# Patient Record
Sex: FEMALE | Race: BLACK OR AFRICAN AMERICAN | NOT HISPANIC OR LATINO
[De-identification: names, ages, dates, MRNs, and addresses within clinical notes are randomized per-mention and may not be internally consistent; named-entity substitution may affect disease eponyms.]

---

## 2017-02-01 ENCOUNTER — APPOINTMENT (OUTPATIENT)
Dept: INTERNAL MEDICINE | Facility: CLINIC | Age: 42
End: 2017-02-01

## 2017-02-01 VITALS — SYSTOLIC BLOOD PRESSURE: 122 MMHG | DIASTOLIC BLOOD PRESSURE: 80 MMHG

## 2017-02-01 VITALS
HEIGHT: 62 IN | DIASTOLIC BLOOD PRESSURE: 80 MMHG | WEIGHT: 169 LBS | HEART RATE: 89 BPM | BODY MASS INDEX: 31.1 KG/M2 | SYSTOLIC BLOOD PRESSURE: 120 MMHG | OXYGEN SATURATION: 99 %

## 2017-02-01 VITALS — DIASTOLIC BLOOD PRESSURE: 80 MMHG | SYSTOLIC BLOOD PRESSURE: 132 MMHG

## 2017-02-01 VITALS — SYSTOLIC BLOOD PRESSURE: 142 MMHG | DIASTOLIC BLOOD PRESSURE: 80 MMHG

## 2017-02-02 LAB
25(OH)D3 SERPL-MCNC: 11.8 NG/ML
ALBUMIN SERPL ELPH-MCNC: 3.8 G/DL
ALP BLD-CCNC: 40 U/L
ALT SERPL-CCNC: 10 U/L
ANION GAP SERPL CALC-SCNC: 13 MMOL/L
AST SERPL-CCNC: 15 U/L
BASOPHILS # BLD AUTO: 0.02 K/UL
BASOPHILS NFR BLD AUTO: 0.5 %
BILIRUB SERPL-MCNC: 0.7 MG/DL
BUN SERPL-MCNC: 13 MG/DL
CALCIUM SERPL-MCNC: 9.4 MG/DL
CHLORIDE SERPL-SCNC: 103 MMOL/L
CHOLEST SERPL-MCNC: 268 MG/DL
CHOLEST/HDLC SERPL: 2.9 RATIO
CO2 SERPL-SCNC: 23 MMOL/L
CREAT SERPL-MCNC: 0.78 MG/DL
CREAT SPEC-SCNC: 208 MG/DL
CREAT/PROT UR: 0 RATIO
EOSINOPHIL # BLD AUTO: 0.11 K/UL
EOSINOPHIL NFR BLD AUTO: 2.9 %
FERRITIN SERPL-MCNC: 8.9 NG/ML
GLUCOSE SERPL-MCNC: 82 MG/DL
HBA1C MFR BLD HPLC: 5.9 %
HCT VFR BLD CALC: 33.5 %
HDLC SERPL-MCNC: 93 MG/DL
HGB BLD-MCNC: 10.1 G/DL
IMM GRANULOCYTES NFR BLD AUTO: 0 %
IRON SATN MFR SERPL: 7 %
IRON SERPL-MCNC: 30 UG/DL
LDLC SERPL CALC-MCNC: 167 MG/DL
LYMPHOCYTES # BLD AUTO: 2.42 K/UL
LYMPHOCYTES NFR BLD AUTO: 63.2 %
MAN DIFF?: NORMAL
MCHC RBC-ENTMCNC: 22.9 PG
MCHC RBC-ENTMCNC: 30.1 GM/DL
MCV RBC AUTO: 75.8 FL
MONOCYTES # BLD AUTO: 0.26 K/UL
MONOCYTES NFR BLD AUTO: 6.8 %
NEUTROPHILS # BLD AUTO: 1.02 K/UL
NEUTROPHILS NFR BLD AUTO: 26.6 %
PLATELET # BLD AUTO: 366 K/UL
POTASSIUM SERPL-SCNC: 4.2 MMOL/L
PROT SERPL-MCNC: 7.4 G/DL
PROT UR-MCNC: 7 MG/DL
RBC # BLD: 4.42 M/UL
RBC # FLD: 18.5 %
SODIUM SERPL-SCNC: 139 MMOL/L
TIBC SERPL-MCNC: 430 UG/DL
TRIGL SERPL-MCNC: 39 MG/DL
UIBC SERPL-MCNC: 400 UG/DL
WBC # FLD AUTO: 3.83 K/UL

## 2017-02-06 LAB
ADJUSTED MITOGEN: >10 IU/ML
ADJUSTED TB AG: 0 IU/ML
M TB IFN-G BLD-IMP: NEGATIVE
QUANTIFERON GOLD NIL: 0.06 IU/ML

## 2017-02-17 ENCOUNTER — RX RENEWAL (OUTPATIENT)
Age: 42
End: 2017-02-17

## 2017-03-03 ENCOUNTER — APPOINTMENT (OUTPATIENT)
Dept: INTERNAL MEDICINE | Facility: CLINIC | Age: 42
End: 2017-03-03

## 2017-03-03 VITALS
DIASTOLIC BLOOD PRESSURE: 80 MMHG | HEART RATE: 76 BPM | OXYGEN SATURATION: 97 % | HEIGHT: 62 IN | SYSTOLIC BLOOD PRESSURE: 114 MMHG | BODY MASS INDEX: 31.83 KG/M2 | WEIGHT: 173 LBS

## 2017-03-03 VITALS — SYSTOLIC BLOOD PRESSURE: 116 MMHG | DIASTOLIC BLOOD PRESSURE: 74 MMHG

## 2017-03-03 RX ORDER — IRON/C/B12/CALCIU/STOMACH CONC 70-150-10
70 TABLET ORAL DAILY
Qty: 90 | Refills: 3 | Status: DISCONTINUED | COMMUNITY
Start: 2017-02-02 | End: 2017-03-03

## 2017-06-02 ENCOUNTER — APPOINTMENT (OUTPATIENT)
Dept: OPHTHALMOLOGY | Facility: CLINIC | Age: 42
End: 2017-06-02

## 2017-06-02 DIAGNOSIS — Z83.3 FAMILY HISTORY OF DIABETES MELLITUS: ICD-10-CM

## 2017-06-02 DIAGNOSIS — H20.9 UNSPECIFIED IRIDOCYCLITIS: ICD-10-CM

## 2017-06-02 DIAGNOSIS — Z86.79 PERSONAL HISTORY OF OTHER DISEASES OF THE CIRCULATORY SYSTEM: ICD-10-CM

## 2017-06-02 DIAGNOSIS — Z82.49 FAMILY HISTORY OF ISCHEMIC HEART DISEASE AND OTHER DISEASES OF THE CIRCULATORY SYSTEM: ICD-10-CM

## 2017-06-02 DIAGNOSIS — Z78.9 OTHER SPECIFIED HEALTH STATUS: ICD-10-CM

## 2017-06-02 RX ORDER — DORZOLAMIDE HYDROCHLORIDE TIMOLOL MALEATE 20; 5 MG/ML; MG/ML
22.3-6.8 SOLUTION/ DROPS OPHTHALMIC TWICE DAILY
Qty: 1 | Refills: 0 | Status: ACTIVE | COMMUNITY
Start: 2017-06-02 | End: 1900-01-01

## 2017-07-07 ENCOUNTER — APPOINTMENT (OUTPATIENT)
Dept: OPHTHALMOLOGY | Facility: CLINIC | Age: 42
End: 2017-07-07

## 2017-07-13 ENCOUNTER — LABORATORY RESULT (OUTPATIENT)
Age: 42
End: 2017-07-13

## 2017-07-13 ENCOUNTER — APPOINTMENT (OUTPATIENT)
Dept: INTERNAL MEDICINE | Facility: CLINIC | Age: 42
End: 2017-07-13

## 2017-07-13 RX ORDER — IBUPROFEN 600 MG/1
600 TABLET, FILM COATED ORAL
Qty: 30 | Refills: 0 | Status: COMPLETED | COMMUNITY
Start: 2017-02-17

## 2017-07-13 RX ORDER — ERGOCALCIFEROL 1.25 MG/1
1.25 MG CAPSULE, LIQUID FILLED ORAL
Qty: 12 | Refills: 1 | Status: COMPLETED | COMMUNITY
Start: 2017-02-02 | End: 2018-01-09

## 2017-07-13 RX ORDER — OXYCODONE AND ACETAMINOPHEN 5; 325 MG/1; MG/1
5-325 TABLET ORAL
Qty: 15 | Refills: 0 | Status: COMPLETED | COMMUNITY
Start: 2017-02-15

## 2017-07-13 RX ORDER — DOXYCYCLINE 100 MG/1
100 CAPSULE ORAL
Qty: 14 | Refills: 0 | Status: COMPLETED | COMMUNITY
Start: 2017-02-17

## 2017-07-14 ENCOUNTER — RX RENEWAL (OUTPATIENT)
Age: 42
End: 2017-07-14

## 2017-07-14 LAB
25(OH)D3 SERPL-MCNC: 20.8 NG/ML
BASOPHILS # BLD AUTO: 0 K/UL
BASOPHILS NFR BLD AUTO: 0 %
EOSINOPHIL # BLD AUTO: 0.06 K/UL
EOSINOPHIL NFR BLD AUTO: 1.8 %
FERRITIN SERPL-MCNC: 10 NG/ML
HCT VFR BLD CALC: 32.7 %
HGB BLD-MCNC: 10.6 G/DL
IRON SATN MFR SERPL: 10 %
IRON SERPL-MCNC: 40 UG/DL
LYMPHOCYTES # BLD AUTO: 1.74 K/UL
LYMPHOCYTES NFR BLD AUTO: 54.3 %
MAN DIFF?: NORMAL
MCHC RBC-ENTMCNC: 25.4 PG
MCHC RBC-ENTMCNC: 32.4 GM/DL
MCV RBC AUTO: 78.4 FL
MONOCYTES # BLD AUTO: 0.2 K/UL
MONOCYTES NFR BLD AUTO: 6.1 %
NEUTROPHILS # BLD AUTO: 1.16 K/UL
NEUTROPHILS NFR BLD AUTO: 36 %
PLATELET # BLD AUTO: 321 K/UL
RBC # BLD: 4.17 M/UL
RBC # FLD: 15.4 %
TIBC SERPL-MCNC: 393 UG/DL
UIBC SERPL-MCNC: 353 UG/DL
WBC # FLD AUTO: 3.21 K/UL

## 2017-08-02 ENCOUNTER — RX RENEWAL (OUTPATIENT)
Age: 42
End: 2017-08-02

## 2017-08-07 ENCOUNTER — APPOINTMENT (OUTPATIENT)
Dept: INTERNAL MEDICINE | Facility: CLINIC | Age: 42
End: 2017-08-07

## 2017-10-12 ENCOUNTER — RX RENEWAL (OUTPATIENT)
Age: 42
End: 2017-10-12

## 2017-10-30 ENCOUNTER — APPOINTMENT (OUTPATIENT)
Dept: OPHTHALMOLOGY | Facility: CLINIC | Age: 42
End: 2017-10-30
Payer: COMMERCIAL

## 2017-10-30 PROCEDURE — 92012 INTRM OPH EXAM EST PATIENT: CPT

## 2017-10-30 PROCEDURE — 92133 CPTRZD OPH DX IMG PST SGM ON: CPT

## 2017-10-30 RX ORDER — BRIMONIDINE TARTRATE 1 MG/ML
0.1 SOLUTION/ DROPS OPHTHALMIC TWICE DAILY
Qty: 1 | Refills: 3 | Status: ACTIVE | COMMUNITY
Start: 2017-06-02 | End: 1900-01-01

## 2017-10-30 RX ORDER — DIFLUPREDNATE 0.5 MG/ML
0.05 EMULSION OPHTHALMIC
Qty: 1 | Refills: 3 | Status: ACTIVE | COMMUNITY
Start: 2017-10-30 | End: 1900-01-01

## 2018-02-02 ENCOUNTER — APPOINTMENT (OUTPATIENT)
Dept: OPHTHALMOLOGY | Facility: CLINIC | Age: 43
End: 2018-02-02

## 2018-02-20 ENCOUNTER — RX RENEWAL (OUTPATIENT)
Age: 43
End: 2018-02-20

## 2018-07-27 ENCOUNTER — APPOINTMENT (OUTPATIENT)
Dept: INTERNAL MEDICINE | Facility: CLINIC | Age: 43
End: 2018-07-27
Payer: COMMERCIAL

## 2018-07-27 VITALS — DIASTOLIC BLOOD PRESSURE: 76 MMHG | SYSTOLIC BLOOD PRESSURE: 124 MMHG

## 2018-07-27 VITALS
HEART RATE: 72 BPM | DIASTOLIC BLOOD PRESSURE: 90 MMHG | OXYGEN SATURATION: 97 % | HEIGHT: 62 IN | SYSTOLIC BLOOD PRESSURE: 138 MMHG | BODY MASS INDEX: 32.76 KG/M2 | WEIGHT: 178 LBS

## 2018-07-27 PROCEDURE — 99213 OFFICE O/P EST LOW 20 MIN: CPT

## 2018-07-27 RX ORDER — FERROUS SULFATE 137(45) MG
142 (45 FE) TABLET, EXTENDED RELEASE ORAL
Qty: 90 | Refills: 3 | Status: COMPLETED | COMMUNITY
Start: 2017-03-03 | End: 2017-07-27

## 2018-07-27 RX ORDER — METAXALONE 800 MG/1
800 TABLET ORAL
Qty: 6 | Refills: 0 | Status: COMPLETED | COMMUNITY
Start: 2018-07-27 | End: 2018-08-02

## 2018-07-27 RX ORDER — METRONIDAZOLE 500 MG/1
500 TABLET ORAL
Qty: 14 | Refills: 0 | Status: COMPLETED | COMMUNITY
Start: 2018-04-19

## 2018-07-27 RX ORDER — IPRATROPIUM BROMIDE 42 UG/1
0.06 SPRAY NASAL 3 TIMES DAILY
Qty: 1 | Refills: 5 | Status: COMPLETED | COMMUNITY
Start: 2017-07-13 | End: 2018-07-27

## 2018-07-27 RX ORDER — VALSARTAN 80 MG/1
80 TABLET, COATED ORAL
Qty: 180 | Refills: 3 | Status: DISCONTINUED | COMMUNITY
Start: 2017-02-01 | End: 2018-07-27

## 2018-07-27 RX ORDER — DORZOLAMIDE HYDROCHLORIDE TIMOLOL MALEATE 20; 5 MG/ML; MG/ML
22.3-6.8 SOLUTION/ DROPS OPHTHALMIC TWICE DAILY
Qty: 1 | Refills: 2 | Status: COMPLETED | COMMUNITY
Start: 2017-08-02 | End: 2017-09-27

## 2018-07-27 NOTE — REVIEW OF SYSTEMS
[Fever] : no fever [Chills] : no chills [Fatigue] : no fatigue [Chest Pain] : no chest pain [Palpitations] : no palpitations [Leg Claudication] : no leg claudication [Shortness Of Breath] : no shortness of breath
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
## 2018-07-29 ENCOUNTER — FORM ENCOUNTER (OUTPATIENT)
Age: 43
End: 2018-07-29

## 2018-07-30 ENCOUNTER — OUTPATIENT (OUTPATIENT)
Dept: OUTPATIENT SERVICES | Facility: HOSPITAL | Age: 43
LOS: 1 days | End: 2018-07-30
Payer: COMMERCIAL

## 2018-07-30 ENCOUNTER — APPOINTMENT (OUTPATIENT)
Dept: RADIOLOGY | Facility: CLINIC | Age: 43
End: 2018-07-30
Payer: COMMERCIAL

## 2018-07-30 DIAGNOSIS — M62.838 OTHER MUSCLE SPASM: ICD-10-CM

## 2018-07-30 PROCEDURE — 72020 X-RAY EXAM OF SPINE 1 VIEW: CPT

## 2018-07-30 PROCEDURE — 72020 X-RAY EXAM OF SPINE 1 VIEW: CPT | Mod: 26

## 2018-08-06 ENCOUNTER — APPOINTMENT (OUTPATIENT)
Dept: OPHTHALMOLOGY | Facility: CLINIC | Age: 43
End: 2018-08-06
Payer: COMMERCIAL

## 2018-08-06 ENCOUNTER — TRANSCRIPTION ENCOUNTER (OUTPATIENT)
Age: 43
End: 2018-08-06

## 2018-08-06 PROCEDURE — 92014 COMPRE OPH EXAM EST PT 1/>: CPT

## 2018-08-06 PROCEDURE — 92133 CPTRZD OPH DX IMG PST SGM ON: CPT

## 2018-08-06 RX ORDER — TIMOLOL MALEATE 5 MG/ML
0.5 SOLUTION OPHTHALMIC
Qty: 3 | Refills: 3 | Status: ACTIVE | COMMUNITY
Start: 2017-10-30 | End: 1900-01-01

## 2018-08-23 ENCOUNTER — APPOINTMENT (OUTPATIENT)
Dept: INTERNAL MEDICINE | Facility: CLINIC | Age: 43
End: 2018-08-23
Payer: COMMERCIAL

## 2018-08-23 VITALS
WEIGHT: 177 LBS | DIASTOLIC BLOOD PRESSURE: 78 MMHG | OXYGEN SATURATION: 99 % | HEIGHT: 62 IN | BODY MASS INDEX: 32.57 KG/M2 | HEART RATE: 78 BPM | TEMPERATURE: 98.7 F | SYSTOLIC BLOOD PRESSURE: 134 MMHG

## 2018-08-23 PROCEDURE — 99214 OFFICE O/P EST MOD 30 MIN: CPT

## 2018-08-23 RX ORDER — DROSPIRENONE, ETHINYL ESTRADIOL AND LEVOMEFOLATE CALCIUM AND LEVOMEFOLATE CALCIUM 3-0.02(24)
3-0.02-0.451 KIT ORAL
Qty: 28 | Refills: 0 | Status: COMPLETED | COMMUNITY
Start: 2018-01-09 | End: 2018-02-23

## 2018-08-23 NOTE — HISTORY OF PRESENT ILLNESS
[FreeTextEntry1] : HTN [de-identified] : 42 yo 80% better neck pain  No trauma\par \par HTN  does take BP at home low at home, high at work 158/91  at home 143/90\par Very dizzy on felodipine 2016\par \par Had lower BP and HA w felodipine\par Unclear to me why not taking HCTZ  She was under the impression to stop it since she did not void when taking it

## 2018-08-23 NOTE — REVIEW OF SYSTEMS
[Fever] : no fever [Discharge] : no discharge [Chest Pain] : no chest pain [Palpitations] : no palpitations [Dysuria] : no dysuria

## 2018-08-23 NOTE — COUNSELING
[None] : None [de-identified] : Not exercising yet 16-18 hours a day 4 days a week \par Vacation [place]\par Insurance didn't pay for telmisartan\par \par Uveitis W/U negative  lens implant

## 2018-08-24 LAB
25(OH)D3 SERPL-MCNC: 21.4 NG/ML
ALBUMIN SERPL ELPH-MCNC: 4.1 G/DL
ALP BLD-CCNC: 36 U/L
ALT SERPL-CCNC: 9 U/L
ANION GAP SERPL CALC-SCNC: 19 MMOL/L
AST SERPL-CCNC: 14 U/L
BILIRUB SERPL-MCNC: 0.4 MG/DL
BUN SERPL-MCNC: 14 MG/DL
CALCIUM SERPL-MCNC: 9.4 MG/DL
CHLORIDE SERPL-SCNC: 106 MMOL/L
CHOLEST SERPL-MCNC: 224 MG/DL
CHOLEST/HDLC SERPL: 2.7 RATIO
CO2 SERPL-SCNC: 18 MMOL/L
CREAT SERPL-MCNC: 1.05 MG/DL
CREAT SPEC-SCNC: 203 MG/DL
CREAT/PROT UR: 0 RATIO
GLUCOSE SERPL-MCNC: 77 MG/DL
HDLC SERPL-MCNC: 82 MG/DL
HIV1+2 AB SPEC QL IA.RAPID: NONREACTIVE
LDLC SERPL CALC-MCNC: 124 MG/DL
POTASSIUM SERPL-SCNC: 4.9 MMOL/L
PROT SERPL-MCNC: 7.3 G/DL
PROT UR-MCNC: 9 MG/DL
SODIUM SERPL-SCNC: 143 MMOL/L
T PALLIDUM AB SER QL IA: NEGATIVE
TRIGL SERPL-MCNC: 92 MG/DL

## 2018-10-05 ENCOUNTER — APPOINTMENT (OUTPATIENT)
Dept: INTERNAL MEDICINE | Facility: CLINIC | Age: 43
End: 2018-10-05
Payer: COMMERCIAL

## 2018-10-05 VITALS — DIASTOLIC BLOOD PRESSURE: 60 MMHG | SYSTOLIC BLOOD PRESSURE: 104 MMHG

## 2018-10-05 VITALS — DIASTOLIC BLOOD PRESSURE: 70 MMHG | SYSTOLIC BLOOD PRESSURE: 114 MMHG

## 2018-10-05 VITALS
OXYGEN SATURATION: 99 % | BODY MASS INDEX: 33.49 KG/M2 | DIASTOLIC BLOOD PRESSURE: 78 MMHG | HEIGHT: 62 IN | HEART RATE: 104 BPM | WEIGHT: 182 LBS | SYSTOLIC BLOOD PRESSURE: 132 MMHG

## 2018-10-05 PROCEDURE — 99396 PREV VISIT EST AGE 40-64: CPT

## 2018-10-05 PROCEDURE — 99386 PREV VISIT NEW AGE 40-64: CPT
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
## 2018-10-05 NOTE — ASSESSMENT
[FreeTextEntry1] : Reacting to higher ARB dose  so reduce to once daily in AM and RTO check BP on HCTZ 12.5 + losartan 50mg ONCE instead of twice daily\par \par Declines flu vaccine\par Get med release mammogram

## 2018-10-05 NOTE — HEALTH RISK ASSESSMENT
[No falls in past year] : Patient reported no falls in the past year [Discussed at today's visit] : Advance Directives Discussed at today's visit [Patient reported PAP Smear was normal] : Patient reported PAP Smear was normal [] : No [MammogramDate] : 01/18 [MammogramComments] : BIRADS 1 [PapSmearDate] : 06/18

## 2018-10-10 ENCOUNTER — APPOINTMENT (OUTPATIENT)
Dept: OPHTHALMOLOGY | Facility: CLINIC | Age: 43
End: 2018-10-10

## 2018-11-16 ENCOUNTER — APPOINTMENT (OUTPATIENT)
Dept: INTERNAL MEDICINE | Facility: CLINIC | Age: 43
End: 2018-11-16

## 2019-02-20 ENCOUNTER — APPOINTMENT (OUTPATIENT)
Dept: OPHTHALMOLOGY | Facility: CLINIC | Age: 44
End: 2019-02-20
Payer: COMMERCIAL

## 2019-02-20 ENCOUNTER — APPOINTMENT (OUTPATIENT)
Dept: OPHTHALMOLOGY | Facility: CLINIC | Age: 44
End: 2019-02-20
Payer: SELF-PAY

## 2019-02-20 DIAGNOSIS — H40.051 OCULAR HYPERTENSION, RIGHT EYE: ICD-10-CM

## 2019-02-20 PROCEDURE — 92310B: CUSTOM

## 2019-02-20 PROCEDURE — 92133 CPTRZD OPH DX IMG PST SGM ON: CPT

## 2019-02-20 PROCEDURE — 92012 INTRM OPH EXAM EST PATIENT: CPT

## 2019-02-20 PROCEDURE — 92015 DETERMINE REFRACTIVE STATE: CPT

## 2019-03-07 ENCOUNTER — APPOINTMENT (OUTPATIENT)
Dept: OPHTHALMOLOGY | Facility: CLINIC | Age: 44
End: 2019-03-07
Payer: SELF-PAY

## 2019-03-07 PROCEDURE — 92331: CPT | Mod: NC

## 2019-03-19 ENCOUNTER — APPOINTMENT (OUTPATIENT)
Dept: OPHTHALMOLOGY | Facility: CLINIC | Age: 44
End: 2019-03-19
Payer: COMMERCIAL

## 2019-03-19 PROCEDURE — 00009N: CUSTOM | Mod: NC

## 2019-04-02 ENCOUNTER — APPOINTMENT (OUTPATIENT)
Dept: OPHTHALMOLOGY | Facility: CLINIC | Age: 44
End: 2019-04-02
Payer: COMMERCIAL

## 2019-04-02 PROCEDURE — 92331: CPT | Mod: NC

## 2019-11-01 ENCOUNTER — TRANSCRIPTION ENCOUNTER (OUTPATIENT)
Age: 44
End: 2019-11-01

## 2019-11-02 ENCOUNTER — EMERGENCY (EMERGENCY)
Facility: HOSPITAL | Age: 44
LOS: 1 days | Discharge: HOME | End: 2019-11-02
Attending: EMERGENCY MEDICINE
Payer: COMMERCIAL

## 2019-11-02 VITALS — SYSTOLIC BLOOD PRESSURE: 157 MMHG | HEART RATE: 74 BPM | DIASTOLIC BLOOD PRESSURE: 95 MMHG | TEMPERATURE: 98 F

## 2019-11-02 VITALS — DIASTOLIC BLOOD PRESSURE: 122 MMHG | SYSTOLIC BLOOD PRESSURE: 217 MMHG

## 2019-11-02 LAB
ALBUMIN SERPL ELPH-MCNC: 4.2 G/DL — SIGNIFICANT CHANGE UP (ref 3.3–5)
ALP SERPL-CCNC: 53 U/L — SIGNIFICANT CHANGE UP (ref 40–120)
ALT FLD-CCNC: 10 U/L — SIGNIFICANT CHANGE UP (ref 10–45)
ANION GAP SERPL CALC-SCNC: 9 MMOL/L — SIGNIFICANT CHANGE UP (ref 5–17)
AST SERPL-CCNC: 9 U/L — LOW (ref 10–40)
BASOPHILS # BLD AUTO: 0.03 K/UL — SIGNIFICANT CHANGE UP (ref 0–0.2)
BASOPHILS NFR BLD AUTO: 0.6 % — SIGNIFICANT CHANGE UP (ref 0–2)
BILIRUB SERPL-MCNC: 0.6 MG/DL — SIGNIFICANT CHANGE UP (ref 0.2–1.2)
BUN SERPL-MCNC: 11 MG/DL — SIGNIFICANT CHANGE UP (ref 7–23)
CALCIUM SERPL-MCNC: 9.1 MG/DL — SIGNIFICANT CHANGE UP (ref 8.4–10.5)
CHLORIDE SERPL-SCNC: 105 MMOL/L — SIGNIFICANT CHANGE UP (ref 96–108)
CO2 SERPL-SCNC: 24 MMOL/L — SIGNIFICANT CHANGE UP (ref 22–31)
CREAT SERPL-MCNC: 0.95 MG/DL — SIGNIFICANT CHANGE UP (ref 0.5–1.3)
EOSINOPHIL # BLD AUTO: 0.09 K/UL — SIGNIFICANT CHANGE UP (ref 0–0.5)
EOSINOPHIL NFR BLD AUTO: 1.7 % — SIGNIFICANT CHANGE UP (ref 0–6)
GLUCOSE SERPL-MCNC: 97 MG/DL — SIGNIFICANT CHANGE UP (ref 70–99)
HCT VFR BLD CALC: 35 % — SIGNIFICANT CHANGE UP (ref 34.5–45)
HGB BLD-MCNC: 11.5 G/DL — SIGNIFICANT CHANGE UP (ref 11.5–15.5)
IMM GRANULOCYTES NFR BLD AUTO: 0.2 % — SIGNIFICANT CHANGE UP (ref 0–1.5)
LYMPHOCYTES # BLD AUTO: 2.91 K/UL — SIGNIFICANT CHANGE UP (ref 1–3.3)
LYMPHOCYTES # BLD AUTO: 55.3 % — HIGH (ref 13–44)
MCHC RBC-ENTMCNC: 26.4 PG — LOW (ref 27–34)
MCHC RBC-ENTMCNC: 32.9 GM/DL — SIGNIFICANT CHANGE UP (ref 32–36)
MCV RBC AUTO: 80.3 FL — SIGNIFICANT CHANGE UP (ref 80–100)
MONOCYTES # BLD AUTO: 0.32 K/UL — SIGNIFICANT CHANGE UP (ref 0–0.9)
MONOCYTES NFR BLD AUTO: 6.1 % — SIGNIFICANT CHANGE UP (ref 2–14)
NEUTROPHILS # BLD AUTO: 1.9 K/UL — SIGNIFICANT CHANGE UP (ref 1.8–7.4)
NEUTROPHILS NFR BLD AUTO: 36.1 % — LOW (ref 43–77)
NRBC # BLD: 0 /100 WBCS — SIGNIFICANT CHANGE UP (ref 0–0)
PLATELET # BLD AUTO: 337 K/UL — SIGNIFICANT CHANGE UP (ref 150–400)
POTASSIUM SERPL-MCNC: 4.2 MMOL/L — SIGNIFICANT CHANGE UP (ref 3.5–5.3)
POTASSIUM SERPL-SCNC: 4.2 MMOL/L — SIGNIFICANT CHANGE UP (ref 3.5–5.3)
PROT SERPL-MCNC: 7.5 G/DL — SIGNIFICANT CHANGE UP (ref 6–8.3)
RBC # BLD: 4.36 M/UL — SIGNIFICANT CHANGE UP (ref 3.8–5.2)
RBC # FLD: 14.3 % — SIGNIFICANT CHANGE UP (ref 10.3–14.5)
SODIUM SERPL-SCNC: 138 MMOL/L — SIGNIFICANT CHANGE UP (ref 135–145)
TROPONIN T, HIGH SENSITIVITY RESULT: <6 NG/L — SIGNIFICANT CHANGE UP (ref 0–51)
WBC # BLD: 5.26 K/UL — SIGNIFICANT CHANGE UP (ref 3.8–10.5)
WBC # FLD AUTO: 5.26 K/UL — SIGNIFICANT CHANGE UP (ref 3.8–10.5)

## 2019-11-02 PROCEDURE — 93010 ELECTROCARDIOGRAM REPORT: CPT

## 2019-11-02 PROCEDURE — 99283 EMERGENCY DEPT VISIT LOW MDM: CPT

## 2019-11-02 PROCEDURE — 93005 ELECTROCARDIOGRAM TRACING: CPT

## 2019-11-02 PROCEDURE — 84484 ASSAY OF TROPONIN QUANT: CPT

## 2019-11-02 PROCEDURE — 99284 EMERGENCY DEPT VISIT MOD MDM: CPT

## 2019-11-02 PROCEDURE — 85027 COMPLETE CBC AUTOMATED: CPT

## 2019-11-02 PROCEDURE — 80053 COMPREHEN METABOLIC PANEL: CPT

## 2019-11-02 RX ORDER — AMLODIPINE BESYLATE 2.5 MG/1
2.5 TABLET ORAL ONCE
Refills: 0 | Status: COMPLETED | OUTPATIENT
Start: 2019-11-02 | End: 2019-11-02

## 2019-11-02 RX ORDER — AMLODIPINE BESYLATE 2.5 MG/1
2 TABLET ORAL
Qty: 0 | Refills: 0 | DISCHARGE
Start: 2019-11-02 | End: 2019-11-08

## 2019-11-02 RX ORDER — AMLODIPINE BESYLATE 2.5 MG/1
1 TABLET ORAL
Qty: 7 | Refills: 0
Start: 2019-11-02 | End: 2019-11-08

## 2019-11-02 RX ADMIN — AMLODIPINE BESYLATE 2.5 MILLIGRAM(S): 2.5 TABLET ORAL at 18:03

## 2019-11-02 NOTE — ED PROVIDER NOTE - PHYSICAL EXAMINATION
Vital signs reviewed, as noted in HPI.  CONSTITUTIONAL: NAD, awake, well-nourished, well-developed, appear stated age.  HEAD: Normocephalic; atraumatic  EYES: EOMI, no nystagmus, no conjunctival injection, no scleral icterus  EARS: no apparent discharge, pinna normal  NOSE: Septum midline, no rhinorrhea  MOUTH/THROAT:  MMM  NECK: Trachea midline, no JVD  CV: Normal S1, S2; no audible murmurs; extremities WWP  RESP: normal work of breathing; CTAB, no stridor  ABD: soft, non-distended; non-tender  : Deferred  MSK/EXT: no edema, normal ROM in all four extremities, no deformities  SKIN: No gross rashes or lesions on exposed skin, no significant trophic changes  NEURO: aaox3, moving all extremities purposefully and spontaneously, awake, intact coordination, CN 2-12 grossly intact.  PSYCH: No psychomotor agitation, no evident response to internal stimuli. Vital signs reviewed, as noted in HPI.  CONSTITUTIONAL: NAD, awake, well-nourished, well-developed, appear stated age.  HEAD: Normocephalic; atraumatic  EYES: EOMI, no nystagmus, no conjunctival injection, no scleral icterus  EARS: no apparent discharge, pinna normal  NOSE: Septum midline, no rhinorrhea  MOUTH/THROAT:  MMM  NECK: Trachea midline, no JVD  CV: Normal S1, S2; no audible murmurs; extremities WWP  RESP: normal work of breathing; CTAB, no stridor  ABD: soft, non-distended; non-tender  : Deferred  MSK/EXT: no edema, normal ROM in all four extremities, no deformities  SKIN: No gross rashes or lesions on exposed skin, no significant trophic changes  NEURO: aaox3, moving all extremities purposefully and spontaneously, awake, intact coordination, CN 2-12 grossly intact.  PSYCH: No psychomotor agitation, no evident response to internal stimuli.    Dr Juan jesus -- Vital signs reviewed, as noted in HPI.  CONSTITUTIONAL: NAD, awake, well-nourished, well-developed, appear stated age.  HEAD: Normocephalic; atraumatic  EYES: EOMI, no nystagmus, no conjunctival injection, no scleral icterus  EARS: no apparent discharge, pinna normal  NOSE: Septum midline, no rhinorrhea  MOUTH/THROAT:  MMM  NECK: Trachea midline, no JVD  CV: Normal S1, S2; no audible murmurs; extremities WWP  RESP: normal work of breathing; CTAB, no stridor  ABD: soft, non-distended; non-tender  : Deferred  MSK/EXT: no edema, normal ROM in all four extremities, no deformities  SKIN: No gross rashes or lesions on exposed skin, no significant trophic changes  NEURO: aaox3, moving all extremities purposefully and spontaneously, awake, intact coordination, CN 2-12 grossly intact.  PSYCH: No psychomotor agitation, no evident response to internal stimuli.      Dr Juan jesus --  Dr Juan jesus --

## 2019-11-02 NOTE — ED PROVIDER NOTE - NS ED ROS FT
In additional the that documented in the HPI, the additional ROS was obtained:    CONSTITUTIONAL: No fever, no chills  EYES: no change in vision, no blurred vision  HEENT: no trouble swallowing, no trouble speaking, no sore throat  NECK: no pain, no stiffness  CV: +chest pain, no palpitations  RESP: no cough, +shortness of breath  GI: no abdominal pain, no nausea, no vomiting, no diarrhea, no constipation, no BRBPR or melena  : No dysuria, no frequency  NEURO: no headache, no new numbness, no weakness, no dizziness  SKIN: no new rashes  HEME: No easy bruising or bleeding  MSK: No joint pain, no recent trauma  Yan Mukherjee M.D. -Resident

## 2019-11-02 NOTE — ED PROVIDER NOTE - NSFOLLOWUPINSTRUCTIONS_ED_ALL_ED_FT
You were evaluated in the ED today for uncontrolled hypertension resulting in hypertensive urgency with blood pressures to the 210s systolic.  Blood pressure this high is dangerous, and you should follow up with your primary care physician to ensure better control.    You should take the new anti-hypertensive agent Amlodipine daily, 2.5mg, until you see your PCP who may have alternative recommendations.    Uncontrolled hypertension has many risks including heart failure, heart disease including heart attack (myocardial infarction), kidney disease, and stroke.    Please return to the ED if you experience symptoms of light-headedness, vision changes, unrelenting headache, chest pain, shortness of breath, or cessation of urine flow.    Thank you for allowing us to participate in your care.

## 2019-11-02 NOTE — ED PROVIDER NOTE - PMH
Asthma    HTN (hypertension)    Impaired fasting glucose    Obesity    Other allergy, other than to medicinal agents    Panuveitis    Retinopathy  nondiabetic proliferative retinopathy  Systemic lupus erythematosus

## 2019-11-02 NOTE — ED ADULT NURSE NOTE - OBJECTIVE STATEMENT
Patient states "It feels like heart burn and like there's a gas bubble in my chest. Pain rated as a 3/10. The patient is a 44 year old female with PMH of SLE, retinopathy, HTN and asthma presents to ED c/o chest pain. Patient states "It feels like heart burn and like there's a gas bubble in my chest. Pain rated as a 3/10. The chest pain and pressure is constant. The pain was accompanied by right-sided numbness and tingling from neck down arm to fingers and dizziness. At the time, patient was seated at work. Patient went with co-worker to clinic on-site at job where BP was found to be 193/103. Dizziness, numbness and tingling resolved about 30 minutes afterwards.   Patient denies N/V/D. Denies headache. Denies vision changes. Facial symmetry intact. Sensation intact. Pt able to move all extremities.  Last ate a light meal around 8:30 AM.   LMP

## 2019-11-02 NOTE — ED PROVIDER NOTE - PATIENT PORTAL LINK FT
You can access the FollowMyHealth Patient Portal offered by [facility] by registering at the following website: [email or web address]. By joining [employer]’s FollowMyHealth portal, you will also be able to view your health information using other applications (apps) compatible with our system.

## 2019-11-02 NOTE — ED PROVIDER NOTE - CLINICAL SUMMARY MEDICAL DECISION MAKING FREE TEXT BOX
44F PMH HTN, asthma presents with hypertensive urgency.  Mild chest pain resolved after BP improvement.  BP usually in 170s recently.  Ddx hypertensive urgency vs emergency with CP and SOB, less likely flash pulmonary edema or other concerning etiologies.  Check labs for evidence of end organ damage, serial BP measurements, EKG.  Tx with oral agent amlodipine.

## 2019-11-02 NOTE — ED PROVIDER NOTE - PSH
H/O colposcopy with loop electrode excision of the cervix  H/O colposcopy with loop electrode excision of the cervix  H/O tooth extraction

## 2019-11-02 NOTE — ED PROVIDER NOTE - OBJECTIVE STATEMENT
44F presents with hypertension.  PMH includes HTN and asthma.  Patient has history of uncontrolled hypertension, recently seen in outside ED with BP to 190s and CP and SOB, not discharged with any additional agent.  Today she has chest pain, pressure like a knot center of chest, BP to 210s.  Some SOB as well.  Started at 2:50pm with tingling of right arm, tender to palpation.  BP resolved without intervention, repeat was 176 systolic.  Patient notes she has appt for BP control with her PCP Dr. Malone on Monday.    Denies other symptoms including fevers, headache, vision changes, abdominal pain, nausea, vomiting.

## 2019-11-02 NOTE — ED PROVIDER NOTE - EYES, MLM
Clear bilaterally, pupils equal, round and reactive to light. Right side lazy eyes--amblyopia for years

## 2019-11-02 NOTE — ED ADULT NURSE NOTE - NSIMPLEMENTINTERV_GEN_ALL_ED
Implemented All Universal Safety Interventions:  [place] to call system. Call bell, personal items and telephone within reach. Instruct patient to call for assistance. Room bathroom lighting operational. Non-slip footwear when patient is off stretcher. Physically safe environment: no spills, clutter or unnecessary equipment. Stretcher in lowest position, wheels locked, appropriate side rails in place.

## 2019-11-02 NOTE — ED PROVIDER NOTE - CARE PROVIDER_API CALL
Malcolm Malone)  Internal Medicine  [address], [address]  [place] [address]  Phone: [phone]  Fax: [phone]  Follow Up Time: 1-3 Days

## 2019-11-02 NOTE — ED PROVIDER NOTE - PROGRESS NOTE DETAILS
Patient reassessed, .  Pain resolved although she still endorses the "knot" in her chest but it is very tolerable, does not feel she needs pain medication.  Patient agreeable with plan for discharge with amlodipine and f/u with Dr Malnoe Monday.

## 2019-11-02 NOTE — ED PROVIDER NOTE - ATTENDING CONTRIBUTION TO CARE
I have seen and evaluated this patient with the resident.   I agree with the findings  unless other wise stated.  I have made appropriate changes in documentations where needed, After my face to face bedside evaluation, I am further  notinF presents with hypertension.  PMH includes HTN and asthma.  Patient has history of uncontrolled hypertension, recently seen in outside ED with BP to 190s and CP and SOB, not discharged with any additional agent.  Today she has chest pain, pressure like a knot center of chest, BP to 210s.  Some SOB as well.  Started at 2:50pm with tingling of right arm, tender to palpation.  BP resolved without intervention, repeat was 176 systolic.  Patient notes she has appt for BP control with her PCP Dr. Malone on Monday. -  Denies other symptoms including fevers, headache, vision changes, abdominal pain, nausea, vomiting.   Alert repeat BP inside ED treatment area /101. see detailed PE will check labs and monitor BP add amlodipine. I have seen and evaluated this patient with the resident.   I agree with the findings  unless other wise stated.  I have made appropriate changes in documentations where needed, After my face to face bedside evaluation, I am further  notinF presents with hypertension.  PMH includes HTN and asthma.  Patient has history of uncontrolled hypertension, recently seen in outside ED with BP to 190s and CP and SOB, not discharged with any additional agent.  Today she has chest pain, pressure like a knot center of chest, BP to 210s.  Some SOB as well.  Started at 2:50pm with tingling of right arm, tender to palpation.  BP resolved without intervention, repeat was 176 systolic.  Patient notes she has appt for BP control with her PCP Dr. Malone on Monday. -  Denies other symptoms including fevers, headache, vision changes, abdominal pain, nausea, vomiting.   Alert repeat BP inside ED treatment area /101. see detailed PE will check labs and monitor BP add amlodipine. PMD follow up

## 2019-11-04 ENCOUNTER — APPOINTMENT (OUTPATIENT)
Dept: INTERNAL MEDICINE | Facility: CLINIC | Age: 44
End: 2019-11-04
Payer: COMMERCIAL

## 2019-11-04 VITALS
OXYGEN SATURATION: 98 % | DIASTOLIC BLOOD PRESSURE: 90 MMHG | SYSTOLIC BLOOD PRESSURE: 170 MMHG | BODY MASS INDEX: 33.68 KG/M2 | HEIGHT: 62 IN | HEART RATE: 98 BPM | WEIGHT: 183 LBS

## 2019-11-04 VITALS — SYSTOLIC BLOOD PRESSURE: 150 MMHG | DIASTOLIC BLOOD PRESSURE: 90 MMHG

## 2019-11-04 PROCEDURE — 99214 OFFICE O/P EST MOD 30 MIN: CPT

## 2019-11-04 RX ORDER — LOSARTAN POTASSIUM 50 MG/1
50 TABLET, FILM COATED ORAL DAILY
Qty: 90 | Refills: 3 | Status: COMPLETED | COMMUNITY
Start: 2018-07-27 | End: 2019-11-04

## 2019-11-04 RX ORDER — MELOXICAM 15 MG/1
15 TABLET ORAL
Qty: 30 | Refills: 2 | Status: COMPLETED | COMMUNITY
Start: 2018-07-27 | End: 2019-11-04

## 2019-11-04 RX ORDER — AMLODIPINE BESYLATE 2.5 MG/1
2.5 TABLET ORAL
Refills: 0 | Status: COMPLETED | COMMUNITY
End: 2019-11-04

## 2019-11-04 RX ORDER — LOSARTAN POTASSIUM 50 MG/1
50 TABLET, FILM COATED ORAL
Qty: 60 | Refills: 11 | Status: DISCONTINUED | COMMUNITY
End: 2019-11-04

## 2019-11-05 NOTE — HISTORY OF PRESENT ILLNESS
[FreeTextEntry1] : Uncontrolled /103  had a bad HA [de-identified] : 44\par \par Given amlodipine to control BP\par More stress recently  Work stressful  About to get fibroids removed\par \par Was noncompliant with BP meds x 7 days last week\par Came back and noted \par was only on LOSARTAN

## 2019-11-05 NOTE — REVIEW OF SYSTEMS
[Chest Pain] : no chest pain [Palpitations] : no palpitations [Leg Claudication] : no leg claudication [Orthopnea] : no orthopnea [Paroxysmal Nocturnal Dyspnea] : no paroxysmal nocturnal dyspnea [Shortness Of Breath] : no shortness of breath [Wheezing] : no wheezing

## 2019-11-05 NOTE — ASSESSMENT
[FreeTextEntry1] : Poor control likely 2/2 noncompliance\par will keep amlodipine and switch to longer acting ARB for better control\par RTO check BP

## 2019-11-11 ENCOUNTER — APPOINTMENT (OUTPATIENT)
Dept: INTERNAL MEDICINE | Facility: CLINIC | Age: 44
End: 2019-11-11
Payer: COMMERCIAL

## 2019-11-11 VITALS
HEIGHT: 62 IN | DIASTOLIC BLOOD PRESSURE: 70 MMHG | WEIGHT: 180 LBS | SYSTOLIC BLOOD PRESSURE: 120 MMHG | HEART RATE: 79 BPM | BODY MASS INDEX: 33.13 KG/M2 | OXYGEN SATURATION: 98 %

## 2019-11-11 VITALS — SYSTOLIC BLOOD PRESSURE: 110 MMHG | DIASTOLIC BLOOD PRESSURE: 80 MMHG

## 2019-11-11 VITALS — DIASTOLIC BLOOD PRESSURE: 80 MMHG | SYSTOLIC BLOOD PRESSURE: 124 MMHG

## 2019-11-11 DIAGNOSIS — Z00.00 ENCOUNTER FOR GENERAL ADULT MEDICAL EXAMINATION W/OUT ABNORMAL FINDINGS: ICD-10-CM

## 2019-11-11 DIAGNOSIS — I15.8 OTHER SECONDARY HYPERTENSION: ICD-10-CM

## 2019-11-11 DIAGNOSIS — Z87.09 PERSONAL HISTORY OF OTHER DISEASES OF THE RESPIRATORY SYSTEM: ICD-10-CM

## 2019-11-11 DIAGNOSIS — E66.9 OBESITY, UNSPECIFIED: ICD-10-CM

## 2019-11-11 DIAGNOSIS — T50.905A OTHER SECONDARY HYPERTENSION: ICD-10-CM

## 2019-11-11 PROCEDURE — G0447 BEHAVIOR COUNSEL OBESITY 15M: CPT

## 2019-11-11 PROCEDURE — 99214 OFFICE O/P EST MOD 30 MIN: CPT

## 2019-11-11 NOTE — COUNSELING
[FreeTextEntry4] : 15 [Good understanding] : Patient has a good understanding of lifestyle changes and steps needed to achieve self management goal [None] : None

## 2019-11-11 NOTE — HISTORY OF PRESENT ILLNESS
[FreeTextEntry1] : Uncontrolled HTN [de-identified] : 44 HTN \par \par Was not taking meds for 7 days then uncontrolled HTN on losartan\par Alreasdy received FLU vaccine and Tdap\par Amlodipine added and I changed ARB to LONGER ACTING and brought back for BP CHECK\par \par

## 2019-11-11 NOTE — ASSESSMENT
[FreeTextEntry1] : HTN well controlled\par At home 150/100 but poor technique\par Will bring in machine and at home use resting on pillows perpendicula

## 2019-11-11 NOTE — PHYSICAL EXAM
[No Acute Distress] : no acute distress [Well Nourished] : well nourished [Well Developed] : well developed [Normal Sclera/Conjunctiva] : normal sclera/conjunctiva [Well-Appearing] : well-appearing [PERRL] : pupils equal round and reactive to light [EOMI] : extraocular movements intact [Normal Oropharynx] : the oropharynx was normal [Normal Outer Ear/Nose] : the outer ears and nose were normal in appearance [No Lymphadenopathy] : no lymphadenopathy [No JVD] : no jugular venous distention [Supple] : supple [Thyroid Normal, No Nodules] : the thyroid was normal and there were no nodules present [No Accessory Muscle Use] : no accessory muscle use [No Respiratory Distress] : no respiratory distress  [Clear to Auscultation] : lungs were clear to auscultation bilaterally [Normal Rate] : normal rate  [Normal S1, S2] : normal S1 and S2 [Regular Rhythm] : with a regular rhythm [No Murmur] : no murmur heard [No Carotid Bruits] : no carotid bruits [No Abdominal Bruit] : a ~M bruit was not heard ~T in the abdomen [No Varicosities] : no varicosities [Pedal Pulses Present] : the pedal pulses are present [No Edema] : there was no peripheral edema [No Palpable Aorta] : no palpable aorta [Soft] : abdomen soft [No Extremity Clubbing/Cyanosis] : no extremity clubbing/cyanosis [Non-distended] : non-distended [Non Tender] : non-tender [No HSM] : no HSM [No Masses] : no abdominal mass palpated [Normal Bowel Sounds] : normal bowel sounds [Normal Posterior Cervical Nodes] : no posterior cervical lymphadenopathy [Normal Anterior Cervical Nodes] : no anterior cervical lymphadenopathy [No CVA Tenderness] : no CVA  tenderness [No Spinal Tenderness] : no spinal tenderness [Grossly Normal Strength/Tone] : grossly normal strength/tone [No Joint Swelling] : no joint swelling [No Rash] : no rash [Coordination Grossly Intact] : coordination grossly intact [No Focal Deficits] : no focal deficits [Normal Gait] : normal gait [Deep Tendon Reflexes (DTR)] : deep tendon reflexes were 2+ and symmetric [Normal Insight/Judgement] : insight and judgment were intact [Normal Affect] : the affect was normal

## 2019-12-09 ENCOUNTER — APPOINTMENT (OUTPATIENT)
Dept: INTERNAL MEDICINE | Facility: CLINIC | Age: 44
End: 2019-12-09
Payer: COMMERCIAL

## 2019-12-09 VITALS
WEIGHT: 180 LBS | HEIGHT: 62 IN | BODY MASS INDEX: 33.13 KG/M2 | SYSTOLIC BLOOD PRESSURE: 130 MMHG | DIASTOLIC BLOOD PRESSURE: 70 MMHG | OXYGEN SATURATION: 98 % | HEART RATE: 75 BPM

## 2019-12-09 PROCEDURE — 99215 OFFICE O/P EST HI 40 MIN: CPT

## 2019-12-09 RX ORDER — LORAZEPAM 1 MG/1
1 TABLET ORAL 3 TIMES DAILY
Qty: 9 | Refills: 0 | Status: COMPLETED | COMMUNITY
Start: 2019-12-09 | End: 2019-12-12

## 2019-12-09 NOTE — HISTORY OF PRESENT ILLNESS
[Asthma] : asthma [(Patient denies any chest pain, claudication, dyspnea on exertion, orthopnea, palpitations or syncope)] : Patient denies any chest pain, claudication, dyspnea on exertion, orthopnea, palpitations or syncope [Aortic Stenosis] : no aortic stenosis [Atrial Fibrillation] : no atrial fibrillation [Sleep Apnea] : no sleep apnea [Coronary Artery Disease] : no coronary artery disease [COPD] : no COPD [Smoker] : not a smoker [Family Member] : no family member with adverse anesthesia reaction/sudden death [FreeTextEntry2] : [id number] [Self] : no previous adverse anesthesia reaction [FreeTextEntry1] : Dr Victor Hugo Quiles ([place]) GYN [FreeTextEntry5] : deconditioning sxs when walking up THREE FLIGHTS OF STAIRS ONLY [FreeTextEntry4] : 43 yo\par \par  with STRESS while on the job. Increases her BP\par Two month of anxiety PREOP   Once awoke with anethesia  in the past retinal surgery  Panic attacks  last occurred NOW and TWO WEEKS AGO

## 2019-12-09 NOTE — REVIEW OF SYSTEMS
[Chest Pain] : no chest pain [Palpitations] : no palpitations [Orthopnea] : no orthopnea [Wheezing] : no wheezing [Paroxysmal Nocturnal Dyspnea] : no paroxysmal nocturnal dyspnea [Shortness Of Breath] : no shortness of breath [FreeTextEntry6] : no wheezing for YEARS [Dyspnea on Exertion] : no dyspnea on exertion [Cough] : no cough

## 2019-12-09 NOTE — REVIEW OF SYSTEMS
[Chest Pain] : no chest pain [Palpitations] : no palpitations [Orthopnea] : no orthopnea [Shortness Of Breath] : no shortness of breath [Paroxysmal Nocturnal Dyspnea] : no paroxysmal nocturnal dyspnea [Wheezing] : no wheezing [Dyspnea on Exertion] : no dyspnea on exertion [FreeTextEntry6] : no wheezing for YEARS [Cough] : no cough

## 2019-12-09 NOTE — HISTORY OF PRESENT ILLNESS
[Asthma] : asthma [(Patient denies any chest pain, claudication, dyspnea on exertion, orthopnea, palpitations or syncope)] : Patient denies any chest pain, claudication, dyspnea on exertion, orthopnea, palpitations or syncope [Aortic Stenosis] : no aortic stenosis [Atrial Fibrillation] : no atrial fibrillation [Sleep Apnea] : no sleep apnea [COPD] : no COPD [Coronary Artery Disease] : no coronary artery disease [Smoker] : not a smoker [Family Member] : no family member with adverse anesthesia reaction/sudden death [FreeTextEntry1] : Dr Victor Hugo Quiles ([place]) GYN [Self] : no previous adverse anesthesia reaction [FreeTextEntry2] : [id number] [FreeTextEntry5] : deconditioning sxs when walking up THREE FLIGHTS OF STAIRS ONLY [FreeTextEntry4] : 45 yo\par \par  with STRESS while on the job. Increases her BP\par Two month of anxiety PREOP   Once awoke with anethesia  in the past retinal surgery  Panic attacks  last occurred NOW and TWO WEEKS AGO

## 2019-12-09 NOTE — ASSESSMENT
[High Risk Surgery - Intraperitoneal, Intrathoracic or Supringuinal Vascular Procedures] : High Risk Surgery - Intraperitoneal, Intrathoracic or Supringuinal Vascular Procedures - No (0) [Prior Cerebrovascular Accident or TIA] : Prior Cerebrovascular Accident or TIA - No (0) [Ischemic Heart Disease] : Ischemic Heart Disease - No (0) [Congestive Heart Failure] : Congestive Heart Failure - No (0) [Creatinine >= 2mg/dL (1 Point)] : Creatinine >= 2mg/dL - No (0) [0] : 0 , RCRI Class: I, Risk of Post-Op Cardiac Complications: 0.4%, Procedure Risk: Low-Risk [Patient Optimized for Surgery] : Patient optimized for surgery [Insulin-dependent Diabetic (1 Point)] : Insulin-dependent Diabetic - No (0) [No Further Testing Recommended] : no further testing recommended [FreeTextEntry4] : 45 yo B woman HTN\par She is having panic attacks because she woke up during eye surgery in the past\par She will D/W Anesthsiology for uptimal anesthsia dosage\par \par BDZ for panic attacks leading up to surgery only. NO chronic meds needed. Specific for pre op\par DO not take BDZ 24 HOURS prior to surgery  Discussed this w her in detail and she understands instructions\par \par Declines pneumovax 23\par prevnar 13\par influenza vaccine  all declined\par CPE\par 40 min spent  > 50% of time discussion and counselling and REVA score moderate  TEMPORARY ANXIETY PREVIOUS ANESTHESIA REACTION

## 2019-12-09 NOTE — PHYSICAL EXAM
[Well Nourished] : well nourished [No Acute Distress] : no acute distress [Well Developed] : well developed [Well-Appearing] : well-appearing [EOMI] : extraocular movements intact [Normal Sclera/Conjunctiva] : normal sclera/conjunctiva [PERRL] : pupils equal round and reactive to light [No JVD] : no jugular venous distention [Normal Oropharynx] : the oropharynx was normal [Normal Outer Ear/Nose] : the outer ears and nose were normal in appearance [No Lymphadenopathy] : no lymphadenopathy [Thyroid Normal, No Nodules] : the thyroid was normal and there were no nodules present [Supple] : supple [No Respiratory Distress] : no respiratory distress  [No Accessory Muscle Use] : no accessory muscle use [Clear to Auscultation] : lungs were clear to auscultation bilaterally [Regular Rhythm] : with a regular rhythm [Normal Rate] : normal rate  [Normal S1, S2] : normal S1 and S2 [No Murmur] : no murmur heard [No Carotid Bruits] : no carotid bruits [No Varicosities] : no varicosities [No Abdominal Bruit] : a ~M bruit was not heard ~T in the abdomen [Pedal Pulses Present] : the pedal pulses are present [No Palpable Aorta] : no palpable aorta [No Edema] : there was no peripheral edema [No Extremity Clubbing/Cyanosis] : no extremity clubbing/cyanosis [Soft] : abdomen soft [Non-distended] : non-distended [Non Tender] : non-tender [Normal Posterior Cervical Nodes] : no posterior cervical lymphadenopathy [No Masses] : no abdominal mass palpated [Normal Bowel Sounds] : normal bowel sounds [No HSM] : no HSM [Normal Anterior Cervical Nodes] : no anterior cervical lymphadenopathy [No Spinal Tenderness] : no spinal tenderness [No CVA Tenderness] : no CVA  tenderness [Grossly Normal Strength/Tone] : grossly normal strength/tone [No Joint Swelling] : no joint swelling [Coordination Grossly Intact] : coordination grossly intact [No Focal Deficits] : no focal deficits [Normal Gait] : normal gait [No Rash] : no rash [Normal Insight/Judgement] : insight and judgment were intact [Deep Tendon Reflexes (DTR)] : deep tendon reflexes were 2+ and symmetric [Normal Affect] : the affect was normal

## 2019-12-09 NOTE — PHYSICAL EXAM
[Well Nourished] : well nourished [No Acute Distress] : no acute distress [Well Developed] : well developed [Well-Appearing] : well-appearing [PERRL] : pupils equal round and reactive to light [EOMI] : extraocular movements intact [Normal Sclera/Conjunctiva] : normal sclera/conjunctiva [Normal Oropharynx] : the oropharynx was normal [No JVD] : no jugular venous distention [Normal Outer Ear/Nose] : the outer ears and nose were normal in appearance [No Lymphadenopathy] : no lymphadenopathy [Thyroid Normal, No Nodules] : the thyroid was normal and there were no nodules present [No Respiratory Distress] : no respiratory distress  [Supple] : supple [Clear to Auscultation] : lungs were clear to auscultation bilaterally [No Accessory Muscle Use] : no accessory muscle use [Normal Rate] : normal rate  [Normal S1, S2] : normal S1 and S2 [Regular Rhythm] : with a regular rhythm [No Carotid Bruits] : no carotid bruits [No Murmur] : no murmur heard [No Varicosities] : no varicosities [Pedal Pulses Present] : the pedal pulses are present [No Abdominal Bruit] : a ~M bruit was not heard ~T in the abdomen [No Extremity Clubbing/Cyanosis] : no extremity clubbing/cyanosis [No Edema] : there was no peripheral edema [No Palpable Aorta] : no palpable aorta [Non Tender] : non-tender [Non-distended] : non-distended [Soft] : abdomen soft [Normal Posterior Cervical Nodes] : no posterior cervical lymphadenopathy [No HSM] : no HSM [No Masses] : no abdominal mass palpated [Normal Bowel Sounds] : normal bowel sounds [No CVA Tenderness] : no CVA  tenderness [Normal Anterior Cervical Nodes] : no anterior cervical lymphadenopathy [No Spinal Tenderness] : no spinal tenderness [No Joint Swelling] : no joint swelling [Grossly Normal Strength/Tone] : grossly normal strength/tone [No Rash] : no rash [No Focal Deficits] : no focal deficits [Normal Gait] : normal gait [Coordination Grossly Intact] : coordination grossly intact [Normal Insight/Judgement] : insight and judgment were intact [Normal Affect] : the affect was normal [Deep Tendon Reflexes (DTR)] : deep tendon reflexes were 2+ and symmetric

## 2019-12-09 NOTE — ASSESSMENT
[High Risk Surgery - Intraperitoneal, Intrathoracic or Supringuinal Vascular Procedures] : High Risk Surgery - Intraperitoneal, Intrathoracic or Supringuinal Vascular Procedures - No (0) [Congestive Heart Failure] : Congestive Heart Failure - No (0) [Creatinine >= 2mg/dL (1 Point)] : Creatinine >= 2mg/dL - No (0) [Ischemic Heart Disease] : Ischemic Heart Disease - No (0) [Prior Cerebrovascular Accident or TIA] : Prior Cerebrovascular Accident or TIA - No (0) [0] : 0 , RCRI Class: I, Risk of Post-Op Cardiac Complications: 0.4%, Procedure Risk: Low-Risk [Patient Optimized for Surgery] : Patient optimized for surgery [Insulin-dependent Diabetic (1 Point)] : Insulin-dependent Diabetic - No (0) [No Further Testing Recommended] : no further testing recommended [FreeTextEntry4] : 45 yo B woman HTN\par She is having panic attacks because she woke up during eye surgery in the past\par She will D/W Anesthsiology for uptimal anesthsia dosage\par \par BDZ for panic attacks leading up to surgery only. NO chronic meds needed. Specific for pre op\par DO not take BDZ 24 HOURS prior to surgery  Discussed this w her in detail and she understands instructions\par \par Declines pneumovax 23\par prevnar 13\par influenza vaccine  all declined\par CPE\par 40 min spent  > 50% of time discussion and counselling and REVA score moderate  TEMPORARY ANXIETY PREVIOUS ANESTHESIA REACTION

## 2020-12-03 ENCOUNTER — TRANSCRIPTION ENCOUNTER (OUTPATIENT)
Age: 45
End: 2020-12-03

## 2020-12-21 ENCOUNTER — RX RENEWAL (OUTPATIENT)
Age: 45
End: 2020-12-21

## 2021-01-19 ENCOUNTER — TRANSCRIPTION ENCOUNTER (OUTPATIENT)
Age: 46
End: 2021-01-19

## 2021-02-01 ENCOUNTER — NON-APPOINTMENT (OUTPATIENT)
Age: 46
End: 2021-02-01

## 2021-02-01 ENCOUNTER — TRANSCRIPTION ENCOUNTER (OUTPATIENT)
Age: 46
End: 2021-02-01

## 2021-02-01 VITALS — DIASTOLIC BLOOD PRESSURE: 97 MMHG | SYSTOLIC BLOOD PRESSURE: 157 MMHG

## 2021-03-02 ENCOUNTER — APPOINTMENT (OUTPATIENT)
Dept: INTERNAL MEDICINE | Facility: CLINIC | Age: 46
End: 2021-03-02
Payer: COMMERCIAL

## 2021-03-02 VITALS
OXYGEN SATURATION: 98 % | RESPIRATION RATE: 16 BRPM | BODY MASS INDEX: 32.2 KG/M2 | WEIGHT: 175 LBS | HEIGHT: 62 IN | DIASTOLIC BLOOD PRESSURE: 95 MMHG | SYSTOLIC BLOOD PRESSURE: 145 MMHG | HEART RATE: 116 BPM

## 2021-03-02 VITALS — SYSTOLIC BLOOD PRESSURE: 140 MMHG | DIASTOLIC BLOOD PRESSURE: 90 MMHG

## 2021-03-02 DIAGNOSIS — M32.9 SYSTEMIC LUPUS ERYTHEMATOSUS, UNSPECIFIED: ICD-10-CM

## 2021-03-02 DIAGNOSIS — R03.0 ELEVATED BLOOD-PRESSURE READING, W/OUT DIAGNOSIS OF HYPERTENSION: ICD-10-CM

## 2021-03-02 DIAGNOSIS — M25.661 STIFFNESS OF RIGHT KNEE, NOT ELSEWHERE CLASSIFIED: ICD-10-CM

## 2021-03-02 DIAGNOSIS — Z01.818 ENCOUNTER FOR OTHER PREPROCEDURAL EXAMINATION: ICD-10-CM

## 2021-03-02 DIAGNOSIS — M62.838 OTHER MUSCLE SPASM: ICD-10-CM

## 2021-03-02 PROCEDURE — 99214 OFFICE O/P EST MOD 30 MIN: CPT

## 2021-03-02 PROCEDURE — 99072 ADDL SUPL MATRL&STAF TM PHE: CPT

## 2021-03-02 NOTE — HISTORY OF PRESENT ILLNESS
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

## 2021-03-04 LAB
25(OH)D3 SERPL-MCNC: 26.5 NG/ML
ALBUMIN SERPL ELPH-MCNC: 4.3 G/DL
ALP BLD-CCNC: 59 U/L
ALT SERPL-CCNC: 10 U/L
ANION GAP SERPL CALC-SCNC: 11 MMOL/L
AST SERPL-CCNC: 14 U/L
BASOPHILS # BLD AUTO: 0.02 K/UL
BASOPHILS NFR BLD AUTO: 0.5 %
BILIRUB SERPL-MCNC: 0.6 MG/DL
BUN SERPL-MCNC: 14 MG/DL
CALCIUM SERPL-MCNC: 9.8 MG/DL
CHLORIDE SERPL-SCNC: 99 MMOL/L
CHOLEST SERPL-MCNC: 301 MG/DL
CO2 SERPL-SCNC: 28 MMOL/L
CREAT SERPL-MCNC: 1.22 MG/DL
CREAT SPEC-SCNC: 239 MG/DL
CREAT SPEC-SCNC: 239 MG/DL
CREAT/PROT UR: 0.1 RATIO
EOSINOPHIL # BLD AUTO: 0.08 K/UL
EOSINOPHIL NFR BLD AUTO: 1.9 %
ESTIMATED AVERAGE GLUCOSE: 120 MG/DL
GLUCOSE SERPL-MCNC: 93 MG/DL
HBA1C MFR BLD HPLC: 5.8 %
HCT VFR BLD CALC: 40 %
HDLC SERPL-MCNC: 83 MG/DL
HGB BLD-MCNC: 12.9 G/DL
IMM GRANULOCYTES NFR BLD AUTO: 0 %
LDLC SERPL CALC-MCNC: 204 MG/DL
LYMPHOCYTES # BLD AUTO: 2.57 K/UL
LYMPHOCYTES NFR BLD AUTO: 59.6 %
MAN DIFF?: NORMAL
MCHC RBC-ENTMCNC: 28 PG
MCHC RBC-ENTMCNC: 32.3 GM/DL
MCV RBC AUTO: 86.8 FL
MICROALBUMIN 24H UR DL<=1MG/L-MCNC: <1.2 MG/DL
MICROALBUMIN/CREAT 24H UR-RTO: NORMAL MG/G
MONOCYTES # BLD AUTO: 0.32 K/UL
MONOCYTES NFR BLD AUTO: 7.4 %
NEUTROPHILS # BLD AUTO: 1.32 K/UL
NEUTROPHILS NFR BLD AUTO: 30.6 %
NONHDLC SERPL-MCNC: 219 MG/DL
PLATELET # BLD AUTO: 363 K/UL
POTASSIUM SERPL-SCNC: 3.4 MMOL/L
PROT SERPL-MCNC: 7.8 G/DL
PROT UR-MCNC: 12 MG/DL
RBC # BLD: 4.61 M/UL
RBC # FLD: 13.7 %
SARS-COV-2 IGG SERPL IA-ACNC: 50.1 INDEX
SARS-COV-2 IGG SERPL QL IA: POSITIVE
SODIUM SERPL-SCNC: 139 MMOL/L
TRIGL SERPL-MCNC: 73 MG/DL
WBC # FLD AUTO: 4.31 K/UL

## 2021-03-05 LAB — HIV1+2 AB SPEC QL IA.RAPID: NONREACTIVE

## 2021-03-10 ENCOUNTER — APPOINTMENT (OUTPATIENT)
Dept: INTERNAL MEDICINE | Facility: CLINIC | Age: 46
End: 2021-03-10
Payer: COMMERCIAL

## 2021-03-10 ENCOUNTER — APPOINTMENT (OUTPATIENT)
Dept: OPHTHALMOLOGY | Facility: CLINIC | Age: 46
End: 2021-03-10
Payer: COMMERCIAL

## 2021-03-10 ENCOUNTER — NON-APPOINTMENT (OUTPATIENT)
Age: 46
End: 2021-03-10

## 2021-03-10 VITALS
TEMPERATURE: 97 F | DIASTOLIC BLOOD PRESSURE: 80 MMHG | WEIGHT: 190 LBS | OXYGEN SATURATION: 98 % | HEIGHT: 62 IN | SYSTOLIC BLOOD PRESSURE: 118 MMHG | BODY MASS INDEX: 34.96 KG/M2 | HEART RATE: 84 BPM

## 2021-03-10 VITALS — SYSTOLIC BLOOD PRESSURE: 116 MMHG | DIASTOLIC BLOOD PRESSURE: 74 MMHG

## 2021-03-10 DIAGNOSIS — S46.811A STRAIN OF OTHER MUSCLES, FASCIA AND TENDONS AT SHOULDER AND UPPER ARM LEVEL, RIGHT ARM, INITIAL ENCOUNTER: ICD-10-CM

## 2021-03-10 DIAGNOSIS — M62.838 OTHER MUSCLE SPASM: ICD-10-CM

## 2021-03-10 LAB — CYTOLOGY CVX/VAG DOC THIN PREP: NORMAL

## 2021-03-10 PROCEDURE — 99072 ADDL SUPL MATRL&STAF TM PHE: CPT

## 2021-03-10 PROCEDURE — 92014 COMPRE OPH EXAM EST PT 1/>: CPT

## 2021-03-10 PROCEDURE — 92133 CPTRZD OPH DX IMG PST SGM ON: CPT

## 2021-03-10 PROCEDURE — 99396 PREV VISIT EST AGE 40-64: CPT

## 2021-03-10 PROCEDURE — 99213 OFFICE O/P EST LOW 20 MIN: CPT | Mod: 25

## 2021-03-10 NOTE — ASSESSMENT
[FreeTextEntry1] : Stop all neck massage\par   repeat FASTING 3 MOS\par Denies sigif Anxiety but has had PANIC ATTACKS since teens

## 2021-03-10 NOTE — HEALTH RISK ASSESSMENT
[Patient reported PAP Smear was normal] : Patient reported PAP Smear was normal [I will adhere to the patient's wishes as expressed in the advance directive except as noted below.] : I will adhere to the patient's wishes as expressed in the advance directive except as noted below [No falls in past year] : Patient reported no falls in the past year [Fully functional (bathing, dressing, toileting, transferring, walking, feeding)] : Fully functional (bathing, dressing, toileting, transferring, walking, feeding) [Fully functional (using the telephone, shopping, preparing meals, housekeeping, doing laundry, using] : Fully functional and needs no help or supervision to perform IADLs (using the telephone, shopping, preparing meals, housekeeping, doing laundry, using transportation, managing medications and managing finances) [PapSmearDate] : 07/20

## 2021-03-10 NOTE — PHYSICAL EXAM
[No Acute Distress] : no acute distress [Well Nourished] : well nourished [Well Developed] : well developed [Well-Appearing] : well-appearing [Normal Sclera/Conjunctiva] : normal sclera/conjunctiva [PERRL] : pupils equal round and reactive to light [EOMI] : extraocular movements intact [Normal Outer Ear/Nose] : the outer ears and nose were normal in appearance [Normal Oropharynx] : the oropharynx was normal [No JVD] : no jugular venous distention [No Lymphadenopathy] : no lymphadenopathy [Supple] : supple [Thyroid Normal, No Nodules] : the thyroid was normal and there were no nodules present [No Respiratory Distress] : no respiratory distress  [No Accessory Muscle Use] : no accessory muscle use [Clear to Auscultation] : lungs were clear to auscultation bilaterally [Normal Rate] : normal rate  [Regular Rhythm] : with a regular rhythm [Normal S1, S2] : normal S1 and S2 [No Murmur] : no murmur heard [No Carotid Bruits] : no carotid bruits [No Abdominal Bruit] : a ~M bruit was not heard ~T in the abdomen [No Varicosities] : no varicosities [Pedal Pulses Present] : the pedal pulses are present [No Edema] : there was no peripheral edema [No Palpable Aorta] : no palpable aorta [No Extremity Clubbing/Cyanosis] : no extremity clubbing/cyanosis [Soft] : abdomen soft [Non Tender] : non-tender [Non-distended] : non-distended [No Masses] : no abdominal mass palpated [No HSM] : no HSM [Normal Bowel Sounds] : normal bowel sounds [Normal Posterior Cervical Nodes] : no posterior cervical lymphadenopathy [Normal Anterior Cervical Nodes] : no anterior cervical lymphadenopathy [No CVA Tenderness] : no CVA  tenderness [No Spinal Tenderness] : no spinal tenderness [No Joint Swelling] : no joint swelling [Grossly Normal Strength/Tone] : grossly normal strength/tone [No Rash] : no rash [Coordination Grossly Intact] : coordination grossly intact [No Focal Deficits] : no focal deficits [Normal Gait] : normal gait [Deep Tendon Reflexes (DTR)] : deep tendon reflexes were 2+ and symmetric [Normal Affect] : the affect was normal [Normal Insight/Judgement] : insight and judgment were intact [de-identified] : R upper trapezius tension

## 2021-03-10 NOTE — HISTORY OF PRESENT ILLNESS
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

## 2021-03-30 ENCOUNTER — APPOINTMENT (OUTPATIENT)
Dept: INTERNAL MEDICINE | Facility: CLINIC | Age: 46
End: 2021-03-30
Payer: COMMERCIAL

## 2021-03-30 VITALS
WEIGHT: 190 LBS | HEART RATE: 93 BPM | DIASTOLIC BLOOD PRESSURE: 76 MMHG | OXYGEN SATURATION: 100 % | SYSTOLIC BLOOD PRESSURE: 115 MMHG | TEMPERATURE: 97.3 F | BODY MASS INDEX: 34.96 KG/M2 | HEIGHT: 62 IN

## 2021-03-30 VITALS — DIASTOLIC BLOOD PRESSURE: 80 MMHG | SYSTOLIC BLOOD PRESSURE: 116 MMHG

## 2021-03-30 PROCEDURE — 99072 ADDL SUPL MATRL&STAF TM PHE: CPT

## 2021-03-30 PROCEDURE — 99213 OFFICE O/P EST LOW 20 MIN: CPT

## 2021-03-30 NOTE — REVIEW OF SYSTEMS
[Fever] : no fever [Shortness Of Breath] : no shortness of breath [Cough] : no cough [Wheezing] : no wheezing

## 2021-03-30 NOTE — HISTORY OF PRESENT ILLNESS
[FreeTextEntry1] : HTN\par Trapezius tightness [de-identified] : 46 yo\par \par We increase HCTZ from 12.5mg to 50mg\par and amlodipine from 2.5mg to 5mgHere to get repeat Basic for hypokalemia\par Now on KCl 10meq\par

## 2021-03-31 LAB
ANION GAP SERPL CALC-SCNC: 15 MMOL/L
BUN SERPL-MCNC: 16 MG/DL
CALCIUM SERPL-MCNC: 9.5 MG/DL
CHLORIDE SERPL-SCNC: 101 MMOL/L
CO2 SERPL-SCNC: 25 MMOL/L
CREAT SERPL-MCNC: 0.94 MG/DL
GLUCOSE SERPL-MCNC: 95 MG/DL
POTASSIUM SERPL-SCNC: 3.8 MMOL/L
SODIUM SERPL-SCNC: 140 MMOL/L

## 2021-04-21 ENCOUNTER — APPOINTMENT (OUTPATIENT)
Dept: PHYSICAL MEDICINE AND REHAB | Facility: CLINIC | Age: 46
End: 2021-04-21

## 2021-04-23 ENCOUNTER — TRANSCRIPTION ENCOUNTER (OUTPATIENT)
Age: 46
End: 2021-04-23

## 2021-04-26 ENCOUNTER — NON-APPOINTMENT (OUTPATIENT)
Age: 46
End: 2021-04-26

## 2021-06-01 ENCOUNTER — APPOINTMENT (OUTPATIENT)
Dept: INTERNAL MEDICINE | Facility: CLINIC | Age: 46
End: 2021-06-01
Payer: COMMERCIAL

## 2021-06-01 ENCOUNTER — NON-APPOINTMENT (OUTPATIENT)
Age: 46
End: 2021-06-01

## 2021-06-01 VITALS
OXYGEN SATURATION: 99 % | DIASTOLIC BLOOD PRESSURE: 76 MMHG | BODY MASS INDEX: 34.23 KG/M2 | TEMPERATURE: 97 F | HEIGHT: 62 IN | SYSTOLIC BLOOD PRESSURE: 113 MMHG | HEART RATE: 95 BPM | WEIGHT: 186 LBS

## 2021-06-01 DIAGNOSIS — F41.0 PANIC DISORDER [EPISODIC PAROXYSMAL ANXIETY]: ICD-10-CM

## 2021-06-01 DIAGNOSIS — R07.89 OTHER CHEST PAIN: ICD-10-CM

## 2021-06-01 DIAGNOSIS — F43.0 PANIC DISORDER [EPISODIC PAROXYSMAL ANXIETY]: ICD-10-CM

## 2021-06-01 PROCEDURE — 99214 OFFICE O/P EST MOD 30 MIN: CPT

## 2021-06-01 NOTE — HISTORY OF PRESENT ILLNESS
[___ Weeks ago] : [unfilled] weeks ago [Episodic] : episodic [Moderate] : moderate [Ice] : ice [de-identified] : when walking [FreeTextEntry8] : 47 yo\par \par Bruised R rib\par NO FRACTURE\par tested COVID May 2020  Terrible pain\par \par Taking nothing  Not out of work\par Panic attacks getting worse because works at [employer]

## 2021-06-01 NOTE — PHYSICAL EXAM
[No Acute Distress] : no acute distress [Well Nourished] : well nourished [Well Developed] : well developed [Well-Appearing] : well-appearing [Normal Sclera/Conjunctiva] : normal sclera/conjunctiva [PERRL] : pupils equal round and reactive to light [EOMI] : extraocular movements intact [Normal Outer Ear/Nose] : the outer ears and nose were normal in appearance [Normal Oropharynx] : the oropharynx was normal [No JVD] : no jugular venous distention [No Lymphadenopathy] : no lymphadenopathy [Supple] : supple [Thyroid Normal, No Nodules] : the thyroid was normal and there were no nodules present [No Respiratory Distress] : no respiratory distress  [No Accessory Muscle Use] : no accessory muscle use [Clear to Auscultation] : lungs were clear to auscultation bilaterally [Normal Rate] : normal rate  [Regular Rhythm] : with a regular rhythm [Normal S1, S2] : normal S1 and S2 [No Murmur] : no murmur heard [No Carotid Bruits] : no carotid bruits [No Abdominal Bruit] : a ~M bruit was not heard ~T in the abdomen [No Varicosities] : no varicosities [Pedal Pulses Present] : the pedal pulses are present [No Edema] : there was no peripheral edema [No Palpable Aorta] : no palpable aorta [No Extremity Clubbing/Cyanosis] : no extremity clubbing/cyanosis [Soft] : abdomen soft [Non Tender] : non-tender [Non-distended] : non-distended [No Masses] : no abdominal mass palpated [No HSM] : no HSM [Normal Bowel Sounds] : normal bowel sounds [Normal Posterior Cervical Nodes] : no posterior cervical lymphadenopathy [Normal Anterior Cervical Nodes] : no anterior cervical lymphadenopathy [No CVA Tenderness] : no CVA  tenderness [No Spinal Tenderness] : no spinal tenderness [No Joint Swelling] : no joint swelling [Grossly Normal Strength/Tone] : grossly normal strength/tone [No Rash] : no rash [Coordination Grossly Intact] : coordination grossly intact [No Focal Deficits] : no focal deficits [Normal Gait] : normal gait [Deep Tendon Reflexes (DTR)] : deep tendon reflexes were 2+ and symmetric [Normal Affect] : the affect was normal [Normal Insight/Judgement] : insight and judgment were intact [de-identified] : under R breast point tenderness  also R rhomboid spasm  [place] [place] ++

## 2021-06-10 ENCOUNTER — TRANSCRIPTION ENCOUNTER (OUTPATIENT)
Age: 46
End: 2021-06-10

## 2021-07-13 ENCOUNTER — APPOINTMENT (OUTPATIENT)
Dept: INTERNAL MEDICINE | Facility: CLINIC | Age: 46
End: 2021-07-13
Payer: COMMERCIAL

## 2021-07-13 VITALS
SYSTOLIC BLOOD PRESSURE: 122 MMHG | HEART RATE: 97 BPM | OXYGEN SATURATION: 99 % | WEIGHT: 186 LBS | HEIGHT: 62 IN | DIASTOLIC BLOOD PRESSURE: 77 MMHG | BODY MASS INDEX: 34.23 KG/M2 | TEMPERATURE: 97 F

## 2021-07-13 VITALS — SYSTOLIC BLOOD PRESSURE: 120 MMHG | DIASTOLIC BLOOD PRESSURE: 86 MMHG

## 2021-07-13 DIAGNOSIS — E78.5 HYPERLIPIDEMIA, UNSPECIFIED: ICD-10-CM

## 2021-07-13 DIAGNOSIS — R59.0 LOCALIZED ENLARGED LYMPH NODES: ICD-10-CM

## 2021-07-13 LAB
ANION GAP SERPL CALC-SCNC: 11 MMOL/L
BASOPHILS # BLD AUTO: 0.01 K/UL
BASOPHILS NFR BLD AUTO: 0.2 %
BUN SERPL-MCNC: 14 MG/DL
CALCIUM SERPL-MCNC: 9.5 MG/DL
CHLORIDE SERPL-SCNC: 106 MMOL/L
CO2 SERPL-SCNC: 21 MMOL/L
CREAT SERPL-MCNC: 1.02 MG/DL
EOSINOPHIL # BLD AUTO: 0.04 K/UL
EOSINOPHIL NFR BLD AUTO: 0.9 %
ERYTHROCYTE [SEDIMENTATION RATE] IN BLOOD BY WESTERGREN METHOD: 34 MM/HR
GLUCOSE SERPL-MCNC: 99 MG/DL
HCT VFR BLD CALC: 39.7 %
HGB BLD-MCNC: 13.2 G/DL
IMM GRANULOCYTES NFR BLD AUTO: 0.2 %
LYMPHOCYTES # BLD AUTO: 2.76 K/UL
LYMPHOCYTES NFR BLD AUTO: 60.4 %
MAN DIFF?: NORMAL
MCHC RBC-ENTMCNC: 28.6 PG
MCHC RBC-ENTMCNC: 33.2 GM/DL
MCV RBC AUTO: 86.1 FL
MONOCYTES # BLD AUTO: 0.33 K/UL
MONOCYTES NFR BLD AUTO: 7.2 %
NEUTROPHILS # BLD AUTO: 1.42 K/UL
NEUTROPHILS NFR BLD AUTO: 31.1 %
PLATELET # BLD AUTO: 356 K/UL
POTASSIUM SERPL-SCNC: 4.7 MMOL/L
RBC # BLD: 4.61 M/UL
RBC # FLD: 13.9 %
SODIUM SERPL-SCNC: 138 MMOL/L
WBC # FLD AUTO: 4.57 K/UL

## 2021-07-13 PROCEDURE — 99214 OFFICE O/P EST MOD 30 MIN: CPT

## 2021-07-13 NOTE — ASSESSMENT
[FreeTextEntry1] : Shoddy iliac and inguinal LN Patient keloids easily Not pathologic LN\par Show films to GYN\par SED rate and STI screen given inguinal LN\par 2019 pelvic surgery may be responsible for shoddy iliac and inguinal physiologic LN Not Pathologic\par \par HTN  Fair DBP control  No change\par F/U 4 mos\par \par May try sertraline 1/2 dose during summer. If poor response may go back to 50mg

## 2021-07-13 NOTE — HISTORY OF PRESENT ILLNESS
[de-identified] : 47 yo\par \par Panic Atttacks helped\par R bruised rib S/P meloxicam\par \par Myomectomy [id number] \par MRI of HIP NO TEAR  SHODDY INGUINAL BL LN SHODDY  iliac chain and pelvic sidewall \par \par Better with sertraline

## 2021-07-13 NOTE — REVIEW OF SYSTEMS
[Fever] : no fever [Chest Pain] : no chest pain [Palpitations] : no palpitations [Shortness Of Breath] : no shortness of breath [Wheezing] : no wheezing [Cough] : no cough

## 2021-07-15 LAB — HIV1+2 AB SPEC QL IA.RAPID: NONREACTIVE

## 2021-08-16 ENCOUNTER — TRANSCRIPTION ENCOUNTER (OUTPATIENT)
Age: 46
End: 2021-08-16

## 2021-08-20 ENCOUNTER — APPOINTMENT (OUTPATIENT)
Dept: GASTROENTEROLOGY | Facility: CLINIC | Age: 46
End: 2021-08-20

## 2021-08-23 ENCOUNTER — TRANSCRIPTION ENCOUNTER (OUTPATIENT)
Age: 46
End: 2021-08-23

## 2021-09-03 ENCOUNTER — TRANSCRIPTION ENCOUNTER (OUTPATIENT)
Age: 46
End: 2021-09-03

## 2021-09-06 ENCOUNTER — TRANSCRIPTION ENCOUNTER (OUTPATIENT)
Age: 46
End: 2021-09-06

## 2021-09-12 ENCOUNTER — TRANSCRIPTION ENCOUNTER (OUTPATIENT)
Age: 46
End: 2021-09-12

## 2021-09-20 ENCOUNTER — TRANSCRIPTION ENCOUNTER (OUTPATIENT)
Age: 46
End: 2021-09-20

## 2021-09-23 ENCOUNTER — APPOINTMENT (OUTPATIENT)
Dept: OPHTHALMOLOGY | Facility: CLINIC | Age: 46
End: 2021-09-23

## 2021-09-26 ENCOUNTER — TRANSCRIPTION ENCOUNTER (OUTPATIENT)
Age: 46
End: 2021-09-26

## 2021-10-04 ENCOUNTER — TRANSCRIPTION ENCOUNTER (OUTPATIENT)
Age: 46
End: 2021-10-04

## 2021-10-11 ENCOUNTER — TRANSCRIPTION ENCOUNTER (OUTPATIENT)
Age: 46
End: 2021-10-11

## 2021-10-19 ENCOUNTER — TRANSCRIPTION ENCOUNTER (OUTPATIENT)
Age: 46
End: 2021-10-19

## 2021-10-25 ENCOUNTER — TRANSCRIPTION ENCOUNTER (OUTPATIENT)
Age: 46
End: 2021-10-25

## 2021-11-01 ENCOUNTER — TRANSCRIPTION ENCOUNTER (OUTPATIENT)
Age: 46
End: 2021-11-01

## 2021-11-08 ENCOUNTER — TRANSCRIPTION ENCOUNTER (OUTPATIENT)
Age: 46
End: 2021-11-08

## 2021-11-17 ENCOUNTER — TRANSCRIPTION ENCOUNTER (OUTPATIENT)
Age: 46
End: 2021-11-17

## 2021-11-24 ENCOUNTER — TRANSCRIPTION ENCOUNTER (OUTPATIENT)
Age: 46
End: 2021-11-24

## 2021-11-27 ENCOUNTER — TRANSCRIPTION ENCOUNTER (OUTPATIENT)
Age: 46
End: 2021-11-27

## 2021-12-15 ENCOUNTER — TRANSCRIPTION ENCOUNTER (OUTPATIENT)
Age: 46
End: 2021-12-15

## 2022-01-18 ENCOUNTER — RX RENEWAL (OUTPATIENT)
Age: 47
End: 2022-01-18

## 2022-02-28 NOTE — ED ADULT NURSE NOTE - CAS EDN DISCHARGE INTERVENTIONS
----- Message from Rajiv Carter MD sent at 2/28/2022 10:11 AM CST -----  Please call the patient and find out if he is planning on obtaining his labs, and a them know that they have been extended    ----- Message -----  From: SYSTEM  Sent: 2/28/2022  12:56 AM CST  To:      IV discontinued, cath removed intact

## 2022-04-20 ENCOUNTER — RX RENEWAL (OUTPATIENT)
Age: 47
End: 2022-04-20

## 2022-04-29 ENCOUNTER — NON-APPOINTMENT (OUTPATIENT)
Age: 47
End: 2022-04-29

## 2022-04-29 ENCOUNTER — APPOINTMENT (OUTPATIENT)
Dept: OPHTHALMOLOGY | Facility: CLINIC | Age: 47
End: 2022-04-29
Payer: COMMERCIAL

## 2022-04-29 PROCEDURE — 92014 COMPRE OPH EXAM EST PT 1/>: CPT

## 2022-04-29 PROCEDURE — 92133 CPTRZD OPH DX IMG PST SGM ON: CPT

## 2022-05-31 ENCOUNTER — NON-APPOINTMENT (OUTPATIENT)
Age: 47
End: 2022-05-31

## 2022-05-31 ENCOUNTER — APPOINTMENT (OUTPATIENT)
Dept: OPHTHALMOLOGY | Facility: CLINIC | Age: 47
End: 2022-05-31
Payer: COMMERCIAL

## 2022-05-31 PROCEDURE — 76514 ECHO EXAM OF EYE THICKNESS: CPT

## 2022-05-31 PROCEDURE — 92014 COMPRE OPH EXAM EST PT 1/>: CPT

## 2022-08-05 ENCOUNTER — RX RENEWAL (OUTPATIENT)
Age: 47
End: 2022-08-05

## 2022-08-15 ENCOUNTER — RX RENEWAL (OUTPATIENT)
Age: 47
End: 2022-08-15

## 2022-10-03 ENCOUNTER — APPOINTMENT (OUTPATIENT)
Dept: OPHTHALMOLOGY | Facility: CLINIC | Age: 47
End: 2022-10-03

## 2022-10-14 ENCOUNTER — APPOINTMENT (OUTPATIENT)
Dept: OPHTHALMOLOGY | Facility: CLINIC | Age: 47
End: 2022-10-14

## 2022-10-14 ENCOUNTER — NON-APPOINTMENT (OUTPATIENT)
Age: 47
End: 2022-10-14

## 2022-10-14 PROCEDURE — 92012 INTRM OPH EXAM EST PATIENT: CPT

## 2022-10-31 ENCOUNTER — APPOINTMENT (OUTPATIENT)
Dept: INTERNAL MEDICINE | Facility: CLINIC | Age: 47
End: 2022-10-31

## 2022-10-31 VITALS
DIASTOLIC BLOOD PRESSURE: 71 MMHG | BODY MASS INDEX: 34.27 KG/M2 | HEIGHT: 62.5 IN | WEIGHT: 191 LBS | OXYGEN SATURATION: 96 % | SYSTOLIC BLOOD PRESSURE: 123 MMHG | HEART RATE: 77 BPM | TEMPERATURE: 97.5 F

## 2022-10-31 VITALS — SYSTOLIC BLOOD PRESSURE: 116 MMHG | DIASTOLIC BLOOD PRESSURE: 84 MMHG

## 2022-10-31 DIAGNOSIS — S46.011S STRAIN OF MUSCLE(S) AND TENDON(S) OF THE ROTATOR CUFF OF RIGHT SHOULDER, SEQUELA: ICD-10-CM

## 2022-10-31 DIAGNOSIS — G57.62 LESION OF PLANTAR NERVE, LEFT LOWER LIMB: ICD-10-CM

## 2022-10-31 DIAGNOSIS — M79.672 PAIN IN LEFT FOOT: ICD-10-CM

## 2022-10-31 DIAGNOSIS — M54.12 RADICULOPATHY, CERVICAL REGION: ICD-10-CM

## 2022-10-31 PROCEDURE — 36415 COLL VENOUS BLD VENIPUNCTURE: CPT

## 2022-10-31 PROCEDURE — 99396 PREV VISIT EST AGE 40-64: CPT | Mod: 25

## 2022-10-31 RX ORDER — VITAMIN K2 90 MCG
125 MCG CAPSULE ORAL
Qty: 12 | Refills: 0 | Status: ACTIVE | COMMUNITY
Start: 2022-10-31

## 2022-11-01 ENCOUNTER — NON-APPOINTMENT (OUTPATIENT)
Age: 47
End: 2022-11-01

## 2022-11-01 LAB
ALBUMIN SERPL ELPH-MCNC: 4.2 G/DL
ALP BLD-CCNC: 59 U/L
ALT SERPL-CCNC: 12 U/L
ANION GAP SERPL CALC-SCNC: 12 MMOL/L
AST SERPL-CCNC: 13 U/L
BASOPHILS # BLD AUTO: 0.02 K/UL
BASOPHILS NFR BLD AUTO: 0.4 %
BILIRUB SERPL-MCNC: 0.5 MG/DL
BUN SERPL-MCNC: 14 MG/DL
CALCIUM SERPL-MCNC: 9.7 MG/DL
CHLORIDE SERPL-SCNC: 100 MMOL/L
CHOLEST SERPL-MCNC: 303 MG/DL
CO2 SERPL-SCNC: 28 MMOL/L
CREAT SERPL-MCNC: 0.86 MG/DL
CREAT SPEC-SCNC: 51 MG/DL
CREAT SPEC-SCNC: 51 MG/DL
CREAT/PROT UR: 0.1 RATIO
EGFR: 84 ML/MIN/1.73M2
EOSINOPHIL # BLD AUTO: 0.07 K/UL
EOSINOPHIL NFR BLD AUTO: 1.4 %
ESTIMATED AVERAGE GLUCOSE: 131 MG/DL
GLUCOSE SERPL-MCNC: 103 MG/DL
HBA1C MFR BLD HPLC: 6.2 %
HCT VFR BLD CALC: 39 %
HDLC SERPL-MCNC: 74 MG/DL
HGB BLD-MCNC: 12.7 G/DL
IMM GRANULOCYTES NFR BLD AUTO: 0 %
LDLC SERPL CALC-MCNC: 207 MG/DL
LYMPHOCYTES # BLD AUTO: 2.95 K/UL
LYMPHOCYTES NFR BLD AUTO: 58.2 %
MAN DIFF?: NORMAL
MCHC RBC-ENTMCNC: 28.7 PG
MCHC RBC-ENTMCNC: 32.6 GM/DL
MCV RBC AUTO: 88 FL
MICROALBUMIN 24H UR DL<=1MG/L-MCNC: <1.2 MG/DL
MICROALBUMIN/CREAT 24H UR-RTO: NORMAL MG/G
MONOCYTES # BLD AUTO: 0.31 K/UL
MONOCYTES NFR BLD AUTO: 6.1 %
NEUTROPHILS # BLD AUTO: 1.72 K/UL
NEUTROPHILS NFR BLD AUTO: 33.9 %
NONHDLC SERPL-MCNC: 229 MG/DL
PLATELET # BLD AUTO: 335 K/UL
POTASSIUM SERPL-SCNC: 3.5 MMOL/L
PROT SERPL-MCNC: 7.7 G/DL
PROT UR-MCNC: 4 MG/DL
RBC # BLD: 4.43 M/UL
RBC # FLD: 14 %
SODIUM SERPL-SCNC: 140 MMOL/L
TRIGL SERPL-MCNC: 111 MG/DL
WBC # FLD AUTO: 5.07 K/UL

## 2022-11-01 RX ORDER — GABAPENTIN 300 MG/1
300 CAPSULE ORAL
Qty: 60 | Refills: 0 | Status: COMPLETED | COMMUNITY
Start: 2022-08-10

## 2022-11-01 RX ORDER — AMOXICILLIN 500 MG/1
500 TABLET, FILM COATED ORAL
Qty: 21 | Refills: 0 | Status: COMPLETED | COMMUNITY
Start: 2022-10-07

## 2022-11-01 RX ORDER — SENNOSIDES 50; 8.6 MG/1; MG/1
200 TABLET ORAL
Qty: 90 | Refills: 3 | Status: ACTIVE | COMMUNITY
Start: 2022-11-01

## 2022-11-01 NOTE — HEALTH RISK ASSESSMENT
[PHQ-2 Negative - No further assessment needed] : PHQ-2 Negative - No further assessment needed [[id number]] : 0

## 2022-11-01 NOTE — PHYSICAL EXAM
[No Acute Distress] : no acute distress [Well Nourished] : well nourished [Well Developed] : well developed [Well-Appearing] : well-appearing [Normal Sclera/Conjunctiva] : normal sclera/conjunctiva [PERRL] : pupils equal round and reactive to light [EOMI] : extraocular movements intact [Normal Outer Ear/Nose] : the outer ears and nose were normal in appearance [Normal Oropharynx] : the oropharynx was normal [No JVD] : no jugular venous distention [No Lymphadenopathy] : no lymphadenopathy [Supple] : supple [Thyroid Normal, No Nodules] : the thyroid was normal and there were no nodules present [No Respiratory Distress] : no respiratory distress  [No Accessory Muscle Use] : no accessory muscle use [Clear to Auscultation] : lungs were clear to auscultation bilaterally [Normal Rate] : normal rate  [Regular Rhythm] : with a regular rhythm [Normal S1, S2] : normal S1 and S2 [No Murmur] : no murmur heard [No Carotid Bruits] : no carotid bruits [No Abdominal Bruit] : a ~M bruit was not heard ~T in the abdomen [No Varicosities] : no varicosities [Pedal Pulses Present] : the pedal pulses are present [No Edema] : there was no peripheral edema [No Palpable Aorta] : no palpable aorta [No Extremity Clubbing/Cyanosis] : no extremity clubbing/cyanosis [Normal Appearance] : normal in appearance [No Nipple Discharge] : no nipple discharge [No Axillary Lymphadenopathy] : no axillary lymphadenopathy [Soft] : abdomen soft [Non Tender] : non-tender [Non-distended] : non-distended [No Masses] : no abdominal mass palpated [No HSM] : no HSM [Normal Bowel Sounds] : normal bowel sounds [Normal Posterior Cervical Nodes] : no posterior cervical lymphadenopathy [Normal Anterior Cervical Nodes] : no anterior cervical lymphadenopathy [No CVA Tenderness] : no CVA  tenderness [No Spinal Tenderness] : no spinal tenderness [No Joint Swelling] : no joint swelling [Grossly Normal Strength/Tone] : grossly normal strength/tone [No Rash] : no rash [Coordination Grossly Intact] : coordination grossly intact [No Focal Deficits] : no focal deficits [Normal Gait] : normal gait [Deep Tendon Reflexes (DTR)] : deep tendon reflexes were 2+ and symmetric [Normal Affect] : the affect was normal [Normal Insight/Judgement] : insight and judgment were intact [de-identified] : NO C7 C8 weakness  5/5    numb L 3rd toe at times
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
## 2022-11-23 ENCOUNTER — EMERGENCY (EMERGENCY)
Facility: HOSPITAL | Age: 47
LOS: 1 days | Discharge: HOME | End: 2022-11-23
Attending: STUDENT IN AN ORGANIZED HEALTH CARE EDUCATION/TRAINING PROGRAM
Payer: COMMERCIAL

## 2022-11-23 ENCOUNTER — NON-APPOINTMENT (OUTPATIENT)
Age: 47
End: 2022-11-23

## 2022-11-23 VITALS
OXYGEN SATURATION: 100 % | DIASTOLIC BLOOD PRESSURE: 81 MMHG | SYSTOLIC BLOOD PRESSURE: 141 MMHG | TEMPERATURE: 98 F | HEART RATE: 93 BPM | RESPIRATION RATE: 16 BRPM

## 2022-11-23 LAB
ALBUMIN SERPL ELPH-MCNC: 4.6 G/DL — SIGNIFICANT CHANGE UP (ref 3.3–5)
ALP SERPL-CCNC: 59 U/L — SIGNIFICANT CHANGE UP (ref 40–120)
ALT FLD-CCNC: 14 U/L — SIGNIFICANT CHANGE UP (ref 10–45)
ANION GAP SERPL CALC-SCNC: 13 MMOL/L — SIGNIFICANT CHANGE UP (ref 5–17)
ANION GAP SERPL CALC-SCNC: 15 MMOL/L — SIGNIFICANT CHANGE UP (ref 5–17)
APTT BLD: 35.6 SEC — HIGH (ref 27.5–35.5)
AST SERPL-CCNC: 23 U/L — SIGNIFICANT CHANGE UP (ref 10–40)
BASE EXCESS BLDV CALC-SCNC: 3.5 MMOL/L — HIGH (ref -2–3)
BASOPHILS # BLD AUTO: 0.01 K/UL — SIGNIFICANT CHANGE UP (ref 0–0.2)
BASOPHILS NFR BLD AUTO: 0.2 % — SIGNIFICANT CHANGE UP (ref 0–2)
BILIRUB SERPL-MCNC: 0.7 MG/DL — SIGNIFICANT CHANGE UP (ref 0.2–1.2)
BUN SERPL-MCNC: 13 MG/DL — SIGNIFICANT CHANGE UP (ref 7–23)
BUN SERPL-MCNC: 14 MG/DL — SIGNIFICANT CHANGE UP (ref 7–23)
CA-I SERPL-SCNC: 1.2 MMOL/L — SIGNIFICANT CHANGE UP (ref 1.15–1.33)
CALCIUM SERPL-MCNC: 10 MG/DL — SIGNIFICANT CHANGE UP (ref 8.4–10.5)
CALCIUM SERPL-MCNC: 9.9 MG/DL — SIGNIFICANT CHANGE UP (ref 8.4–10.5)
CHLORIDE BLDV-SCNC: 98 MMOL/L — SIGNIFICANT CHANGE UP (ref 96–108)
CHLORIDE SERPL-SCNC: 100 MMOL/L — SIGNIFICANT CHANGE UP (ref 96–108)
CHLORIDE SERPL-SCNC: 98 MMOL/L — SIGNIFICANT CHANGE UP (ref 96–108)
CO2 BLDV-SCNC: 29 MMOL/L — HIGH (ref 22–26)
CO2 SERPL-SCNC: 25 MMOL/L — SIGNIFICANT CHANGE UP (ref 22–31)
CO2 SERPL-SCNC: 26 MMOL/L — SIGNIFICANT CHANGE UP (ref 22–31)
CREAT SERPL-MCNC: 0.92 MG/DL — SIGNIFICANT CHANGE UP (ref 0.5–1.3)
CREAT SERPL-MCNC: 0.98 MG/DL — SIGNIFICANT CHANGE UP (ref 0.5–1.3)
EGFR: 72 ML/MIN/1.73M2 — SIGNIFICANT CHANGE UP
EGFR: 77 ML/MIN/1.73M2 — SIGNIFICANT CHANGE UP
EOSINOPHIL # BLD AUTO: 0.02 K/UL — SIGNIFICANT CHANGE UP (ref 0–0.5)
EOSINOPHIL NFR BLD AUTO: 0.4 % — SIGNIFICANT CHANGE UP (ref 0–6)
GAS PNL BLDV: 135 MMOL/L — LOW (ref 136–145)
GAS PNL BLDV: SIGNIFICANT CHANGE UP
GLUCOSE BLDV-MCNC: 104 MG/DL — HIGH (ref 70–99)
GLUCOSE SERPL-MCNC: 100 MG/DL — HIGH (ref 70–99)
GLUCOSE SERPL-MCNC: 103 MG/DL — HIGH (ref 70–99)
HCG SERPL-ACNC: <2 MIU/ML — SIGNIFICANT CHANGE UP
HCO3 BLDV-SCNC: 28 MMOL/L — SIGNIFICANT CHANGE UP (ref 22–29)
HCT VFR BLD CALC: 39.7 % — SIGNIFICANT CHANGE UP (ref 34.5–45)
HCT VFR BLDA CALC: 42 % — SIGNIFICANT CHANGE UP (ref 34.5–46.5)
HGB BLD CALC-MCNC: 13.9 G/DL — SIGNIFICANT CHANGE UP (ref 11.7–16.1)
HGB BLD-MCNC: 13.8 G/DL — SIGNIFICANT CHANGE UP (ref 11.5–15.5)
IMM GRANULOCYTES NFR BLD AUTO: 0.2 % — SIGNIFICANT CHANGE UP (ref 0–0.9)
INR BLD: 1.05 RATIO — SIGNIFICANT CHANGE UP (ref 0.88–1.16)
LACTATE BLDV-MCNC: 2.1 MMOL/L — HIGH (ref 0.5–2)
LYMPHOCYTES # BLD AUTO: 3.02 K/UL — SIGNIFICANT CHANGE UP (ref 1–3.3)
LYMPHOCYTES # BLD AUTO: 53.4 % — HIGH (ref 13–44)
MCHC RBC-ENTMCNC: 29.2 PG — SIGNIFICANT CHANGE UP (ref 27–34)
MCHC RBC-ENTMCNC: 34.8 GM/DL — SIGNIFICANT CHANGE UP (ref 32–36)
MCV RBC AUTO: 84.1 FL — SIGNIFICANT CHANGE UP (ref 80–100)
MONOCYTES # BLD AUTO: 0.53 K/UL — SIGNIFICANT CHANGE UP (ref 0–0.9)
MONOCYTES NFR BLD AUTO: 9.4 % — SIGNIFICANT CHANGE UP (ref 2–14)
NEUTROPHILS # BLD AUTO: 2.07 K/UL — SIGNIFICANT CHANGE UP (ref 1.8–7.4)
NEUTROPHILS NFR BLD AUTO: 36.4 % — LOW (ref 43–77)
NRBC # BLD: 0 /100 WBCS — SIGNIFICANT CHANGE UP (ref 0–0)
PCO2 BLDV: 40 MMHG — SIGNIFICANT CHANGE UP (ref 39–42)
PH BLDV: 7.45 — HIGH (ref 7.32–7.43)
PLATELET # BLD AUTO: 344 K/UL — SIGNIFICANT CHANGE UP (ref 150–400)
PO2 BLDV: 23 MMHG — LOW (ref 25–45)
POTASSIUM BLDV-SCNC: 3 MMOL/L — LOW (ref 3.5–5.1)
POTASSIUM SERPL-MCNC: 3 MMOL/L — LOW (ref 3.5–5.3)
POTASSIUM SERPL-MCNC: 3.3 MMOL/L — LOW (ref 3.5–5.3)
POTASSIUM SERPL-SCNC: 3 MMOL/L — LOW (ref 3.5–5.3)
POTASSIUM SERPL-SCNC: 3.3 MMOL/L — LOW (ref 3.5–5.3)
PROT SERPL-MCNC: 8.7 G/DL — HIGH (ref 6–8.3)
PROTHROM AB SERPL-ACNC: 12.2 SEC — SIGNIFICANT CHANGE UP (ref 10.5–13.4)
RBC # BLD: 4.72 M/UL — SIGNIFICANT CHANGE UP (ref 3.8–5.2)
RBC # FLD: 13.2 % — SIGNIFICANT CHANGE UP (ref 10.3–14.5)
SAO2 % BLDV: 33.4 % — LOW (ref 67–88)
SARS-COV-2 RNA SPEC QL NAA+PROBE: SIGNIFICANT CHANGE UP
SODIUM SERPL-SCNC: 138 MMOL/L — SIGNIFICANT CHANGE UP (ref 135–145)
SODIUM SERPL-SCNC: 139 MMOL/L — SIGNIFICANT CHANGE UP (ref 135–145)
TROPONIN T, HIGH SENSITIVITY RESULT: <6 NG/L — SIGNIFICANT CHANGE UP (ref 0–51)
WBC # BLD: 5.66 K/UL — SIGNIFICANT CHANGE UP (ref 3.8–10.5)
WBC # FLD AUTO: 5.66 K/UL — SIGNIFICANT CHANGE UP (ref 3.8–10.5)

## 2022-11-23 PROCEDURE — 0042T: CPT | Mod: MA

## 2022-11-23 PROCEDURE — 70551 MRI BRAIN STEM W/O DYE: CPT | Mod: 26,MG

## 2022-11-23 PROCEDURE — 71045 X-RAY EXAM CHEST 1 VIEW: CPT | Mod: 26

## 2022-11-23 PROCEDURE — 70498 CT ANGIOGRAPHY NECK: CPT | Mod: 26,MA

## 2022-11-23 PROCEDURE — 99220: CPT

## 2022-11-23 PROCEDURE — G1004: CPT

## 2022-11-23 PROCEDURE — 70496 CT ANGIOGRAPHY HEAD: CPT | Mod: 26,MA

## 2022-11-23 RX ORDER — TIMOLOL MALEATE 10 MG/1
0 TABLET ORAL
Qty: 0 | Refills: 0 | DISCHARGE

## 2022-11-23 RX ORDER — AMLODIPINE BESYLATE 2.5 MG/1
5 TABLET ORAL DAILY
Refills: 0 | Status: DISCONTINUED | OUTPATIENT
Start: 2022-11-24 | End: 2022-11-27

## 2022-11-23 RX ORDER — POTASSIUM CHLORIDE 20 MEQ
40 PACKET (EA) ORAL ONCE
Refills: 0 | Status: COMPLETED | OUTPATIENT
Start: 2022-11-23 | End: 2022-11-23

## 2022-11-23 RX ORDER — DUREZOL 0.5 MG/ML
0 EMULSION OPHTHALMIC
Qty: 0 | Refills: 0 | DISCHARGE

## 2022-11-23 RX ORDER — HYDROCHLOROTHIAZIDE 25 MG
1 TABLET ORAL
Qty: 0 | Refills: 0 | DISCHARGE

## 2022-11-23 RX ORDER — TELMISARTAN 20 MG/1
1 TABLET ORAL
Qty: 0 | Refills: 0 | DISCHARGE

## 2022-11-23 RX ADMIN — Medication 40 MILLIEQUIVALENT(S): at 23:46

## 2022-11-23 NOTE — CONSULT NOTE ADULT - ASSESSMENT
Patient WIL RINCON is a 47y (1975) woman with HTN, prediabetes, asthma, uveitis s/p R eye replacement presents as code stroke for slurred speech and right side tingling and total body heaviness. The dysarthria has been on and off for the last week, however today at 11 am she noticed it was worse while talking to her co-worker. She also had word finding difficulty as well. She excused herself from the conversation and went outside for a walk. It is unclear how long the episode lasted, however now resolved when she came to the ED.    Patient reports total body heaviness occurred since starting Rosuvastatin on 10/31. After taking the medication in the morning she feels total body heaviness, but denies focal weakness. She has stenosis C3-C4 for which she has tingling in her right arm, however for the last several days she has tingling sensation to right face arm and leg. She also endorses chest tightness during the episode as well. Denied numbness, changes to vision, headache, dizziness, N/V. 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     Not TPA candidate due to out of window, no focal deficits  Not thrombectomy candidate due to no LVO      Impression: Right sided tingling sensation, Resolved dysarthria and anomia with full body heaviness possibly due to Toxic Metabolic,  Infectious etiology vs Medication side effect vs less likely Acute Ischemic stroke     Recommendations:  Imaging/Labs  [] Follow up Final reads CTH, CTA and CTP  [] CDU for MRI brain w/o   [] HbA1C and Lipid Panel    Other  [] Neurochecks and vital signs per unit protocol, Q4H  [] Normotension  [] BG goal <180, avoid hypoglycemia         Discussed with Dr. Haywood stroke Fellow undersupervision of stroke attending. Case to be seen by Neurology attending.  Patient WIL RINCON is a 47y (1975) woman with HTN, prediabetes, asthma, uveitis s/p R eye replacement presents as code stroke for slurred speech and right side tingling and total body heaviness. The dysarthria has been on and off for the last week, however today at 11 am she noticed it was worse while talking to her co-worker. She also had word finding difficulty as well. She excused herself from the conversation and went outside for a walk. It is unclear how long the episode lasted, however now resolved when she came to the ED.    Patient reports total body heaviness occurred since starting Rosuvastatin on 10/31. After taking the medication in the morning she feels total body heaviness, but denies focal weakness. She has stenosis C3-C4 for which she has tingling in her right arm, however for the last several days she has tingling sensation to right face arm and leg. She also endorses chest tightness during the episode as well. Denied numbness, changes to vision, headache, dizziness, N/V. 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 Exam nonfocal, no change in sensation, baseline right eye vision loss. Finger stick Glucose 105, VS: /81,  HR 94, Temp 98, Spo2 100%    Not TPA candidate due to out of window, no focal deficits  Not thrombectomy candidate due to no LVO      Impression: Right sided tingling sensation, Resolved dysarthria and anomia with full body heaviness possibly due to Toxic Metabolic,  Infectious etiology vs Medication side effect vs less likely Acute Ischemic stroke     Recommendations:  Imaging/Labs  [] Follow up Final reads CTH, CTA and CTP  [] CDU for MRI brain w/o   [] HbA1C and Lipid Panel  [] Basic Infectious workup    Other  [] Neurochecks and vital signs per unit protocol, Q4H  [] Normotension  [] BG goal <180, avoid hypoglycemia         Discussed with Dr. Haywood stroke Fellow undersupervision of stroke attending. Case to be seen by Neurology attending.  Patient WIL RINCON is a 47y (1975) woman with HTN, prediabetes, asthma, uveitis s/p R eye replacement presents as code stroke for slurred speech and right side tingling and total body heaviness. The dysarthria has been on and off for the last week, however today at 11 am she noticed it was worse while talking to her co-worker. She also had word finding difficulty as well. She excused herself from the conversation and went outside for a walk. It is unclear how long the episode lasted, however now resolved when she came to the ED.    Patient reports total body heaviness occurred since starting Rosuvastatin on 10/31. After taking the medication in the morning she feels total body heaviness, but denies focal weakness. She has stenosis C3-C4 for which she has tingling in her right arm, however for the last several days she has tingling sensation to right face arm and leg. She also endorses chest tightness during the episode as well. Denied numbness, changes to vision, headache, dizziness, N/V. 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 Exam nonfocal, no change in sensation, baseline right eye vision loss. Finger stick Glucose 105, VS: /81,  HR 94, Temp 98, Spo2 100%    Not TPA candidate due to out of window, no focal deficits  Not thrombectomy candidate due to no LVO      Impression: Right sided tingling sensation, Resolved dysarthria and anomia with full body heaviness possibly due to Toxic Metabolic,  Infectious etiology vs Medication side effect vs less likely Acute Ischemic stroke     Recommendations:  Imaging/Labs  [] Follow up Final reads CTA and CTP  [] CDU for MRI brain w/o   [] HbA1C and Lipid Panel  [] Basic Infectious workup    Other  [] Neurochecks and vital signs per unit protocol, Q4H  [] Normotension  [] BG goal <180, avoid hypoglycemia         Discussed with Dr. Haywood stroke Fellow undersupervision of stroke attending. Case to be seen by Neurology attending.  Patient WIL RINCON is a 47y (1975) woman with HTN, prediabetes, asthma, uveitis s/p R eye replacement presents as code stroke for slurred speech and right side tingling and total body heaviness. The dysarthria has been on and off for the last week, however today at 11 am she noticed it was worse while talking to her co-worker. She also had word finding difficulty as well. She excused herself from the conversation and went outside for a walk. It is unclear how long the episode lasted, however now resolved when she came to the ED.    Patient reports total body heaviness occurred since starting Rosuvastatin on 10/31. After taking the medication in the morning she feels total body heaviness, but denies focal weakness. She has stenosis C3-C4 for which she has tingling in her right arm, however for the last several days she has tingling sensation to right face arm and leg. She also endorses chest tightness during the episode as well. Denied numbness, changes to vision, headache, dizziness, N/V. 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 Exam nonfocal, no change in sensation, baseline right eye vision loss. Finger stick Glucose 105, VS: /81,  HR 94, Temp 98, Spo2 100%    Not TPA candidate due to out of window, no focal deficits  Not thrombectomy candidate due to no LVO      Impression: Right sided tingling sensation, Resolved dysarthria and anomia with full body heaviness possibly due to Toxic Metabolic,  Infectious etiology vs Medication side effect vs less likely Acute Ischemic stroke     Recommendations:  Imaging/Labs  [] Follow up Final reads CTA and CTP  [] CDU for MRI brain w/o   [] HbA1C and Lipid Panel  [] Basic Infectious workup    Other  [] Neurochecks and vital signs per unit protocol, Q4H  [] Normotension  [] BG goal <180, avoid hypoglycemia       Discussed with Dr. Haywood stroke Fellow undersupervision of stroke attending. Case to be seen by Neurology attending.  Patient WIL RINCON is a 47y (1975) woman with HTN, prediabetes, asthma, uveitis s/p R eye replacement presents as code stroke for slurred speech and right side tingling and total body heaviness. The dysarthria has been on and off for the last week, however today at 11 am she noticed it was worse while talking to her co-worker. She also had word finding difficulty as well. She excused herself from the conversation and went outside for a walk. It is unclear how long the episode lasted, however now resolved when she came to the ED.    Patient reports total body heaviness occurred since starting Rosuvastatin on 10/31. After taking the medication in the morning she feels total body heaviness, but denies focal weakness. She has stenosis C3-C4 for which she has tingling in her right arm, however for the last several days she has tingling sensation to right face arm and leg. She also endorses chest tightness during the episode as well. Denied numbness, changes to vision, headache, dizziness, N/V. 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 Exam nonfocal, no change in sensation, baseline right eye vision loss. Finger stick Glucose 105, VS: /81,  HR 94, Temp 98, Spo2 100%. CTH no acute findings, CTP and CTA normal.    Not TPA candidate due to out of window, no focal deficits  Not thrombectomy candidate due to no LVO      Impression: Right sided tingling sensation, Resolved dysarthria and anomia with full body heaviness possibly due to Toxic Metabolic,  Infectious etiology vs Medication side effect vs less likely Acute Ischemic stroke     Recommendations:  Imaging/Labs  [] CDU for MRI brain w/o   [] HbA1C and Lipid Panel  [] Basic Infectious workup    Other  [] Neurochecks and vital signs per unit protocol, Q4H  [] Normotension  [] BG goal <180, avoid hypoglycemia       Discussed with Dr. Haywood stroke Fellow undersupervision of stroke attending. Case to be seen by Neurology attending.

## 2022-11-23 NOTE — ED PROVIDER NOTE - ATTENDING CONTRIBUTION TO CARE
I, Margaret Santos, performed a history and physical exam of the patient and discussed their management with the resident and /or advanced care provider. I reviewed the resident and /or ACP's note and agree with the documented findings and plan of care. I was present and available for all procedures.     47-year-old female with history of hypertension, asthma, prediabetes, SLE presenting to the ED with complaints of slurred speech.  Patient reports throughout the week she had mild heaviness of her body primarily right-sided.  States 2 days ago she felt like her speech was mildly slurred but it resolved.  Today while at work approximately 11 AM began having heaviness and pins-and-needles of the right upper and lower extremity.  Felt like her speech was slurred and was having difficulty getting words out.  Reports improvement of the slurred speech and able to speak more clearly at this time.  Notes that she still has pins-and-needles on the right side of her body.  Has an implant in the right eye with poor vision at baseline denies any blurred vision in the left eye.  No heaviness numbness tingling in the left side of the body.  Denies headache or chest pain but reports feeling like her heart is racing.  No abdominal pain nausea or vomiting, no difficulty breathing.  Patient anxious appearing but in no acute distress.  Breath sounds present bilaterally abdomen is soft nontender no rebound or guarding.  No lower extremity edema.  Able to lift both legs against gravity with no drift.  No pronator drift on exam.  Reports different sensation of right upper extremity on exam but unable to explain why.  Strength intact.  Closure called to triage and patient was brought directly to scanner.  Will obtain CT scans, infectious work-up, EKG, labs and neuro Recs.

## 2022-11-23 NOTE — ED CDU PROVIDER INITIAL DAY NOTE - OBJECTIVE STATEMENT
47 year old female with past medical history HTN, Asthma, pre-diabetes, partial hysterectomy 2/2 fibroids  presents to the Emergency Department with chief complaint of slurred speech.  Patient states around 11am she developed sudden onset slurred speech and word-finding difficulty.  Patient stated she felt tingling of her right side and total body heaviness. States that she has a "hole in her R retina," uveitis with blurred vision since, diagnosed over 10 years ago. Patient states that she was injured at work earlier this year, with a cervical spine injury/ rotator cuff injury with weakness to her RUE. States that she is in physical therapy. Started taking Rosuvastatin, Vit D, and COQ10 since Oct 31 and since then has intermittent tingling sensation to RUE since. Patient denies chest pain, dyspnea, fever, or other physical complaints.    ED course: CODE STROKE. CTs negative for acute pathology. Evaluated by neurology, recommending MRIs. Plan for tele monitoring, neuro checks, and imaging in CDU. 47 year old female with past medical history HTN, Asthma, pre-diabetes, partial hysterectomy 2/2 fibroids  presents to the Emergency Department with chief complaint of slurred speech.  Patient states around 11am she developed sudden onset slurred speech and word-finding difficulty.  Patient stated she felt tingling of her right side and total body heaviness. States that she has a "hole in her R retina," uveitis with blurred vision since, diagnosed over 10 years ago. Patient states that she was injured at work earlier this year, with a cervical spine injury/ rotator cuff injury with tingling/weakness to her RUE. States that she is in physical therapy. Started taking Rosuvastatin, Vit D, and COQ10 since Oct 31 and since then has increased tingling sensation to RUE since. Patient denies chest pain, dyspnea, fever, or other physical complaints.    ED course: CODE STROKE. CTs negative for acute pathology. Evaluated by neurology, recommending MRIs. Plan for tele monitoring, neuro checks, and imaging in CDU. 47 year old female with past medical history HTN, Asthma, pre-diabetes, partial hysterectomy 2/2 fibroids  presents to the Emergency Department with chief complaint of slurred speech.  Patient states around 11am she developed sudden onset slurred speech and word-finding difficulty.  Patient stated she felt tingling of her right side and total body heaviness. States that she has a history of "hole in her right retina" and uveitis with blurred vision to the right eye since, diagnosed over 10 years ago. Patient also states that she was injured at work earlier this year, with a cervical spine injury/ rotator cuff injury with tingling/weakness to her RUE since. Also states that she started taking Rosuvastatin, Vit D, and COQ10 on Oct 31 and since then has increased tingling sensations to RUE. Patient denies chest pain, dyspnea, fever, or other physical complaints.    ED course: CODE STROKE. CTs negative for acute pathology. Evaluated by neurology, recommending MRIs. Plan for tele monitoring, neuro checks, and imaging in CDU.

## 2022-11-23 NOTE — STROKE CODE NOTE - CT READ
PM assessment done. No changes noted. Respiration even and unlabored 20/min at rest. No s/s of pain noted at present. Heparin drip dc'd per order. Encouraged to call with needs. 23-Nov-2022 17:20

## 2022-11-23 NOTE — ED CDU PROVIDER INITIAL DAY NOTE - NSICDXPASTMEDICALHX_GEN_ALL_CORE_FT
PAST MEDICAL HISTORY:  Asthma     HTN (hypertension)     Impaired fasting glucose     Obesity     Other allergy, other than to medicinal agents     Panuveitis     Retinopathy nondiabetic proliferative retinopathy    Systemic lupus erythematosus

## 2022-11-23 NOTE — ED CDU PROVIDER INITIAL DAY NOTE - NSICDXPASTSURGICALHX_GEN_ALL_CORE_FT
PAST SURGICAL HISTORY:  H/O colposcopy with loop electrode excision of the cervix H/O colposcopy with loop electrode excision of the cervix    H/O tooth extraction

## 2022-11-23 NOTE — ED CDU PROVIDER INITIAL DAY NOTE - DETAILS
Tele, neuro checks, MRI, neuro following, DW ED attending, vitals every 4 hours, frequent reevaluations

## 2022-11-23 NOTE — ED PROVIDER NOTE - CLINICAL SUMMARY MEDICAL DECISION MAKING FREE TEXT BOX
Patient is a 47 year old female with past medical history significant for lupus and pre-diabetes presents to the Emergency Department with chief complaint of slurred speech.  Patient was a stroke code upon presentation and was immediately evaluated by the neurology/stroke team in the CT scanner.  Patient received EKG, plain films, and CT exams.

## 2022-11-23 NOTE — ED ADULT TRIAGE NOTE - CHIEF COMPLAINT QUOTE
difficulty w/ taking and  finding words starting today, 1 week of "body Heaviness", PMH HTN, asthma.

## 2022-11-23 NOTE — ED PROVIDER NOTE - OBJECTIVE STATEMENT
Patient is a 47 year old female with past medical history significant for lupus and pre-diabetes presents to the Emergency Department with chief complaint of slurred speech.  Patient states around 11am she developed sudden onset slurred speech and word-finding difficulty.  Patient stated she felt transient tingling of her right upper extremity as well.  Patient states symptoms have been resolving since that time.  Upon presentation, the patient endorses a body heaviness.  Patient denies chest pain, dyspnea, fever, or other physical complaints.  No sick contacts or recent travel.

## 2022-11-23 NOTE — ED PROVIDER NOTE - PHYSICAL EXAMINATION
PHYSICAL EXAM:  GENERAL: NAD, lying in bed comfortably  HEAD:  Atraumatic, Normocephalic  EYES: EOMI, PERRLA, conjunctiva and sclera clear  ENT: No erythema/pallor/petechiae/lesions; TMs clear b/l  NECK: Supple, No JVD  LUNG: CTA b/l; no r/r/w  HEART: RRR, +S1/S2; No m/r/g  ABDOMEN: soft, NT/ND; BS audible   EXTREMITIES:  2+ Peripheral Pulses, brisk cap refill. No clubbing, cyanosis, or edema  NERVOUS SYSTEM:  AAOx3, speech clear. No sensory/motor deficits; negative pronator drift, cerebellar signs intact - finger-to-nose exam, non meningismus  MSK: FROM all 4 extremities, full and equal strength  SKIN: No rashes or lesions

## 2022-11-23 NOTE — ED ADULT NURSE NOTE - NS TRANSFER PATIENT BELONGINGS
Indication: Reason For Exam: CP

 

Technique: One view of the chest

 

Comparison: none

 

Findings: Lungs and pleural spaces are clear. Heart size is upper limit normal. No

significant change

 

Impression: No acute process Clothing

## 2022-11-23 NOTE — ED CDU PROVIDER INITIAL DAY NOTE - PHYSICAL EXAMINATION
GEN: Well Appearing, Nontoxic, NAD  HEENT: NC/AT, Symm Facies. PERRL, EOMI  CV: +S1S2, RRR w/o m/g/r  RESP: CTAB w/o w/r/r  ABD: Soft, nt/nd  EXT/MSK: No lower extremity edema or calf tenderness.  FROMx4  SKIN: No visible erythema, lesions or rash  Neuro: AOX3 with normal speech, Sensation grossly intact, strength RUE 4/5, LUE 5/5, b/l lower extremity 5/5  PSYCH: Appropriate mood and affect GEN: Well Appearing, Nontoxic, NAD  HEENT: NC/AT, Symm Facies. EOMI  CV: +S1S2, RRR w/o m/g/r  RESP: CTAB w/o w/r/r  ABD: Soft, nt/nd  EXT/MSK: No lower extremity edema or calf tenderness.  FROMx4  SKIN: No visible erythema, lesions or rash  Neuro: AOX3 with normal speech, Sensation grossly intact, strength RUE 4/5, LUE 5/5, b/l lower extremity 5/5  PSYCH: Appropriate mood and affect

## 2022-11-23 NOTE — ED CDU PROVIDER INITIAL DAY NOTE - ATTENDING APP SHARED VISIT CONTRIBUTION OF CARE
I, Margaret Santos, performed a history and physical exam of the patient and discussed their management with the resident and /or advanced care provider. I reviewed the resident and /or ACP's note and agree with the documented findings and plan of care. I was present and available for all procedures.   see MDM

## 2022-11-23 NOTE — ED ADULT NURSE NOTE - OBJECTIVE STATEMENT
47 year old female, A&Ox4, PMH of HTN, HLD and prediabetes, presenting ambulatory to ED complaining of slurred speech. Patient states she started to experience slurred speech around 11AM today that she described as not being able to get the words out. Patient also states her body is feeling "heavy" 47 year old female, A&Ox4, PMH of HTN, HLD and prediabetes, presenting ambulatory to ED complaining of slurred speech. Patient states she started to experience slurred speech around 11AM today that she described as not being able to get the words out and feeling "stuck". Patient also states her body is feeling "heavy." Patient endorsing a feeling of tingling in her right UE and LE at this time. Patient states she has no vision in her right eye at baseline d/t a transplant. Patient also states she has right arm tingling at baseline d/t a surgery. Patient recently started on statin by MD that she has been taking every morning. Patient denies visual changes at this time. Patient states she felt like she was having a panic attack before coming in. Patient speaking coherently in full sentences. Pupils equal round and reactive to light. EOM intact. No slurred speech or facial droop noted. UE and LE sensation and motor intact. Patient able to ambulate without difficulty. No gait disturbance. Heart rate and rhythm regular. Respirations clear and equal bilaterally. Abdomen soft, nontender and nondistended. Peripheral pulses strong and equal bilaterally. IV placed and labs drawn. Patient placed on cardiac monitor. Safety and comfort measures maintained.

## 2022-11-23 NOTE — ED ADULT NURSE NOTE - SUICIDE SCREENING QUESTION 1
Mr. Washington is here today for anticoagulation monitoring for the diagnosis of Atrial Fibrillation. His INR goal is 2.0-3.0 and his current Coumadin dose is 7.5 mg daily. Today's findings include an INR of 2.2    Considering Mr. Daigle's past history, todays findings, and per the coumadin policy/protocol, Mr. Andres Senior was instructed to take Coumadin as follows,  Resume 7.5 mg. He was also instructed to come back in 1 weeks for an INR check. A full discussion of the nature of anticoagulants has been carried out. A full discussion of the need for frequent and regular monitoring, precise dosage adjustment and compliance was stressed. Side effects of potential bleeding were discussed and Mr. Andres Senior was instructed to call [phone] if there are any signs of abnormal bleeding. Mr. Andres Senior was instructed to avoid any OTC items containing aspirin or ibuprofen and prior to starting any new OTC products to consult with his physician or pharmacist to ensure no drug interactions are present. Mr. Andres Senior was instructed to avoid any major changes in his general diet and to avoid alcohol consumption. .      Mr. Andres Senior verbalized his understanding of all instructions and will call the office with any questions, concerns, or signs of abnormal bleeding or blood clot. No

## 2022-11-23 NOTE — CONSULT NOTE ADULT - SUBJECTIVE AND OBJECTIVE BOX
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 Patient reports symptoms occurred since starting Rosuvastatin on 10/31.     LKN 11/23 11 am   mRS 0  NIHSS 1      Review of Systems:    All other review of systems is negative unless indicated above.    Allergies:      PMHx/PSHx/Family Hx: As above, otherwise see below   Asthma    HTN (hypertension)    Impaired fasting glucose    Retinopathy    Obesity    Panuveitis    Systemic lupus erythematosus    Other allergy, other than to medicinal agents        Social Hx:  No current use of tobacco, alcohol, or illicit drugs  Lives with ***    Medications:  MEDICATIONS  (STANDING):    MEDICATIONS  (PRN):      Vitals:  T(C): --  HR: --  BP: --  RR: --  SpO2: --    Physical Examination: INCOMPLETE  General - NAD  Cardiovascular - Peripheral pulses palpable, no edema  Eyes - Fundoscopy with flat, sharp optic discs and no hemorrhage or exudates; Fundoscopy not well visualized; Fundoscopy not performed due to safety precautions in the setting of the COVID-19 pandemic    Neurologic Exam:  Mental status - Awake, Alert, Oriented to person, place, and time. Speech fluent, repetition and naming intact. Follows simple and complex commands. Attention/concentration, recent and remote memory (including registration and recall), and fund of knowledge intact    Cranial nerves - PERRLA, VFF, EOMI, face sensation (V1-V3) intact b/l, facial strength intact without asymmetry b/l, hearing intact b/l, palate with symmetric elevation, trapezius OR sternocleidomastiod 5/5 strength b/l, tongue midline on protrusion with full lateral movement    Motor - Normal bulk and tone throughout. No pronator drift.  Strength testing            Deltoid      Biceps      Triceps     Wrist Extension    Wrist Flexion     Interossei         R            5                 5               5                     5                              5                        5                 5  L             5                 5               5                     5                              5                        5                 5              Hip Flexion    Hip Extension    Knee Flexion    Knee Extension    Dorsiflexion    Plantar Flexion  R              5                           5                       5                           5                            5                          5  L              5                           5                        5                           5                            5                          5    Sensation - Light touch/temperature OR pain/vibration intact throughout    DTR's -             Biceps      Triceps     Brachioradialis      Patellar    Ankle    Toes/plantar response  R             2+             2+                  2+                       2+            2+                 Down  L              2+             2+                 2+                        2+           2+                 Down    Coordination - Finger to Nose intact b/l. No tremors appreciated    Gait and station - Normal casual gait. Romberg (-)    Labs:          CAPILLARY BLOOD GLUCOSE      POCT Blood Glucose.: 105 mg/dL (23 Nov 2022 16:52)          CSF:                  Radiology:     Neurology - Consult Note    -  Spectra: [phone] ([facility]), [phone] ([facility]  -    HPI: Patient WIL RINCON is a 47y (1975) woman with HTN, prediabetes, asthma, uveitis s/p R eye replacement presents as code stroke for slurred speech and right side tingling and total body heaviness. The dysarthria has been on and off for the last week, however today at 11 am she noticed it was worse while talking to her co-worker. She also had word finding difficulty as well. She excused herself from the conversation and went outside for a walk. It is unclear how long the episode lasted, however now resolved when she came to the ED.    Patient reports total body heaviness occurred since starting Rosuvastatin on 10/31. After taking the medication in the morning she feels total body heaviness, but denies focal weakness. She has stenosis C3-C4 for which she has tingling in her right arm, however for the last several days she has tingling sensation to right face arm and leg. Denied numbness, changes to vision, headache, dizziness, N/V. 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     LKN 11/23 11 am   mRS 0  NIHSS 1      Review of Systems:    All other review of systems is negative unless indicated above.    Allergies:      PMHx/PSHx/Family Hx: As above, otherwise see below   Asthma    HTN (hypertension)    Impaired fasting glucose    Retinopathy    Obesity    Panuveitis        Social Hx:  No current use of tobacco, alcohol, or illicit drugs    Medications:  MEDICATIONS  (STANDING):    MEDICATIONS  (PRN):      Vitals:  T(C): --  HR: --  BP: --  RR: --  SpO2: --    Physical Examination:   General - NAD    Neurologic Exam:  Mental status - Awake, Alert, Oriented to person, place, and time. Speech fluent, repetition and naming intact. Follows simple and complex commands. Attention/concentration, recent and remote memory (including registration and recall), and fund of knowledge intact    Cranial nerves - PERRL, L VFF, R eye vision loss (baseline), EOMI, face sensation (V1-V3) intact b/l, facial strength intact without asymmetry b/l, hearing intact b/l, palate with symmetric elevation, trapezius OR sternocleidomastiod 5/5 strength b/l, tongue midline on protrusion with full lateral movement    Motor - Normal bulk and tone throughout. No pronator drift.    Strength testing            Deltoid      Biceps      Triceps            Wrist Extension    Wrist Flexion                R            4+              4+             4+                     5                              5                        5                  L             5                 5               5                     5                              5                        5                              Hip Flexion    Hip Extension    Knee Flexion            Knee Extension    Dorsiflexion            Plantar Flexion  R              5                           5                       5                           5                            5                          5  L              5                           5                        5                           5                            5                          5    Sensation - Light touch/temperature intact throughout    DTR's -             Biceps      Triceps     Brachioradialis      Patellar    Ankle    Toes/plantar response  R             2+             2+                  2+                       2+            2+                 Down  L              2+             2+                 2+                        2+           2+                 Down    Coordination - Finger to Nose intact b/l. No tremors appreciated    Gait and station - Normal casual gait.     Labs:          CAPILLARY BLOOD GLUCOSE      POCT Blood Glucose.: 105 mg/dL (23 Nov 2022 16:52)          CSF:                  Radiology:  CTH prelim no acute changes   Neurology - Consult Note    -  Spectra: [phone] ([facility]), [phone] ([facility])  -    HPI: Patient WIL RINCON is a 47y (1975) woman with HTN, prediabetes, asthma, uveitis s/p R eye replacement presents as code stroke for slurred speech and right side tingling and total body heaviness. The dysarthria has been on and off for the last week, however today at 11 am she noticed it was worse while talking to her co-worker. She also had word finding difficulty as well. She excused herself from the conversation and went outside for a walk. It is unclear how long the episode lasted, however now resolved when she came to the ED.    Patient reports total body heaviness occurred since starting Rosuvastatin on 10/31. After taking the medication in the morning she feels total body heaviness, but denies focal weakness. She has stenosis C3-C4 for which she has tingling in her right arm, however for the last several days she has tingling sensation to right face arm and leg. Denied numbness, changes to vision, headache, dizziness, N/V. 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     LKN 11/23 11 am   mRS 0  NIHSS 1      Review of Systems:    All other review of systems is negative unless indicated above.    Allergies:      PMHx/PSHx/Family Hx: As above, otherwise see below   Asthma    HTN (hypertension)    Impaired fasting glucose    Retinopathy    Obesity    Panuveitis        Social Hx:  No current use of tobacco, alcohol, or illicit drugs    Medications:  MEDICATIONS  (STANDING):    MEDICATIONS  (PRN):      Vitals:  T(C): --  HR: --  BP: --  RR: --  SpO2: --    Physical Examination:   General - NAD    Neurologic Exam:  Mental status - Awake, Alert, Oriented to person, place, and time. Speech fluent, repetition and naming intact. Follows simple and complex commands. Attention/concentration, recent and remote memory (including registration and recall), and fund of knowledge intact    Cranial nerves - PERRL, L VFF, R eye vision loss (baseline), EOMI, face sensation (V1-V3) intact b/l, facial strength intact without asymmetry b/l, hearing intact b/l, palate with symmetric elevation, trapezius OR sternocleidomastiod 5/5 strength b/l, tongue midline on protrusion with full lateral movement    Motor - Normal bulk and tone throughout. No pronator drift.    Strength testing            Deltoid      Biceps      Triceps            Wrist Extension    Wrist Flexion                R            4+              4+             4+                     5                              5                        5                  L             5                 5               5                     5                              5                        5                              Hip Flexion    Hip Extension    Knee Flexion            Knee Extension    Dorsiflexion            Plantar Flexion  R              5                           5                       5                           5                            5                          5  L              5                           5                        5                           5                            5                          5    Sensation - Light touch/temperature intact throughout    DTR's -             Biceps      Triceps     Brachioradialis      Patellar    Ankle    Toes/plantar response  R             2+             2+                  2+                       2+            2+                 Down  L              2+             2+                 2+                        2+           2+                 Down    Coordination - Finger to Nose intact b/l. No tremors appreciated    Gait and station - Normal casual gait.     Labs:          CAPILLARY BLOOD GLUCOSE      POCT Blood Glucose.: 105 mg/dL (23 Nov 2022 16:52)          Radiology:  < from: CT Brain Stroke Protocol (11.23.22 @ 17:24) >  IMPRESSION:  No acute intracranial hemorrhage, mass effect, midline shift.    < end of copied text >     Neurology - Consult Note    -  Spectra: [phone] ([facility]), [phone] ([facility])  -    HPI: Patient WIL RINCON is a 47y (1975) woman with HTN, prediabetes, asthma, uveitis s/p R eye replacement presents as code stroke for slurred speech and right side tingling and total body heaviness. The dysarthria has been on and off for the last week, however today at 11 am she noticed it was worse while talking to her co-worker. She also had word finding difficulty as well. She excused herself from the conversation and went outside for a walk. It is unclear how long the episode lasted, however now resolved when she came to the ED.    Patient reports total body heaviness occurred since starting Rosuvastatin on 10/31. After taking the medication in the morning she feels total body heaviness, but denies focal weakness. She has stenosis C3-C4 for which she has tingling in her right arm, however for the last several days she has tingling sensation to right face arm and leg. Denied numbness, changes to vision, headache, dizziness, N/V. 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     LKN 11/23 11 am   mRS 0  NIHSS 1      Review of Systems:    All other review of systems is negative unless indicated above.    Allergies:      PMHx/PSHx/Family Hx: As above, otherwise see below   Asthma    HTN (hypertension)    Impaired fasting glucose    Retinopathy    Obesity    Panuveitis        Social Hx:  No current use of tobacco, alcohol, or illicit drugs    Medications:  MEDICATIONS  (STANDING):    MEDICATIONS  (PRN):      Vitals:  T(C): --  HR: --  BP: --  RR: --  SpO2: --    Physical Examination:   General - NAD    Neurologic Exam:  Mental status - Awake, Alert, Oriented to person, place, and time. Speech fluent, repetition and naming intact. Follows simple and complex commands. Attention/concentration, recent and remote memory (including registration and recall), and fund of knowledge intact    Cranial nerves - PERRL, L VFF, R eye vision loss (baseline), EOMI, face sensation (V1-V3) intact b/l, facial strength intact without asymmetry b/l, hearing intact b/l, palate with symmetric elevation, trapezius OR sternocleidomastiod 5/5 strength b/l, tongue midline on protrusion with full lateral movement    Motor - Normal bulk and tone throughout. No pronator drift.    Strength testing            Deltoid      Biceps      Triceps            Wrist Extension    Wrist Flexion                R            4+              4+             4+                     5                              5                        5                  L             5                 5               5                     5                              5                        5                              Hip Flexion    Hip Extension    Knee Flexion            Knee Extension    Dorsiflexion            Plantar Flexion  R              5                           5                       5                           5                            5                          5  L              5                           5                        5                           5                            5                          5    Sensation - Light touch/temperature intact throughout    DTR's -             Biceps      Triceps     Brachioradialis      Patellar    Ankle    Toes/plantar response  R             2+             2+                  2+                       2+            2+                 Down  L              2+             2+                 2+                        2+           2+                 Down    Coordination - Finger to Nose intact b/l. No tremors appreciated    Gait and station - Normal casual gait.     Labs:          CAPILLARY BLOOD GLUCOSE      POCT Blood Glucose.: 105 mg/dL (23 Nov 2022 16:52)          Radiology:  < from: CT Brain Stroke Protocol (11.23.22 @ 17:24) >  IMPRESSION:  No acute intracranial hemorrhage, mass effect, midline shift.    < from: CT Angio Brain Stroke Protocol  w/ IV Cont (11.23.22 @ 17:53) >  IMPRESSION:    CT PERFUSION: No core infarct or penumbra.    If symptoms persist consider follow up head CT or MRI, MRA  if no   contraindication.    CTA COW:  Patent intracranial circulationwithout flow limiting stenosis    CTA NECK: Patent, ECAs, ICAs, no hemodynamically significant stenosis at    ICA origins by NASCET criteria.  Bilateral vertebral arteries are patent without flow limiting stenosis.

## 2022-11-23 NOTE — ED PROVIDER NOTE - NS ED ROS FT
CONSTITUTIONAL: No weakness, fevers or chills  EYES/ENT: No visual changes;  No vertigo or throat pain   NECK: No pain or stiffness  RESPIRATORY: No cough, wheezing, hemoptysis; No shortness of breath  CARDIOVASCULAR: No chest pain or palpitations  GASTROINTESTINAL: No abdominal or epigastric pain. No nausea, vomiting, or hematemesis; No diarrhea or constipation. No melena or hematochezia.  GENITOURINARY: No dysuria, frequency or hematuria  NEUROLOGICAL: + numbness, slurred speech  SKIN: No itching, burning, rashes, or lesions   All other review of systems is negative unless indicated above.

## 2022-11-23 NOTE — ED CDU PROVIDER INITIAL DAY NOTE - MEDICAL DECISION MAKING DETAILS
Danielle [age]-year-old female with history of hypertension, asthma, prediabetes, SLE presenting to the ED with complaints of slurred speech.  Patient reports throughout the week she had mild heaviness of her body primarily right-sided.  States 2 days ago she felt like her speech was mildly slurred but it resolved.  Today while at work approximately 11 AM began having heaviness and pins-and-needles of the right upper and lower extremity.  Felt like her speech was slurred and was having difficulty getting words out.  Reports improvement of the slurred speech and able to speak more clearly at this time.  Notes that she still has pins-and-needles on the right side of her body.  Has an implant in the right eye with poor vision at baseline denies any blurred vision in the left eye.  No heaviness numbness tingling in the left side of the body.  Denies headache or chest pain but reports feeling like her heart is racing.  No abdominal pain nausea or vomiting, no difficulty breathing.  Patient anxious appearing but in no acute distress.  Breath sounds present bilaterally abdomen is soft nontender no rebound or guarding.  No lower extremity edema.  Able to lift both legs against gravity with no drift.  No pronator drift on exam.  Reports different sensation of right upper extremity on exam but unable to explain why.  Strength intact. code stroke was called at triage. pt with no acute findings on CT imaging, non-actionable labs. discussed with neurology and pt placed in the CDU for neuro checks, MRI and neurology following.

## 2022-11-24 VITALS
RESPIRATION RATE: 18 BRPM | SYSTOLIC BLOOD PRESSURE: 112 MMHG | TEMPERATURE: 98 F | HEART RATE: 76 BPM | OXYGEN SATURATION: 98 % | DIASTOLIC BLOOD PRESSURE: 73 MMHG

## 2022-11-24 LAB
A1C WITH ESTIMATED AVERAGE GLUCOSE RESULT: 5.8 % — HIGH (ref 4–5.6)
CHOLEST SERPL-MCNC: 218 MG/DL — HIGH
CK SERPL-CCNC: 158 U/L — SIGNIFICANT CHANGE UP (ref 25–170)
ESTIMATED AVERAGE GLUCOSE: 120 MG/DL — HIGH (ref 68–114)
HDLC SERPL-MCNC: 77 MG/DL — SIGNIFICANT CHANGE UP
LIPID PNL WITH DIRECT LDL SERPL: 129 MG/DL — HIGH
NON HDL CHOLESTEROL: 140 MG/DL — HIGH
TRIGL SERPL-MCNC: 59 MG/DL — SIGNIFICANT CHANGE UP

## 2022-11-24 PROCEDURE — 84702 CHORIONIC GONADOTROPIN TEST: CPT

## 2022-11-24 PROCEDURE — 36000 PLACE NEEDLE IN VEIN: CPT

## 2022-11-24 PROCEDURE — 83735 ASSAY OF MAGNESIUM: CPT

## 2022-11-24 PROCEDURE — 82803 BLOOD GASES ANY COMBINATION: CPT

## 2022-11-24 PROCEDURE — 82947 ASSAY GLUCOSE BLOOD QUANT: CPT

## 2022-11-24 PROCEDURE — 36415 COLL VENOUS BLD VENIPUNCTURE: CPT

## 2022-11-24 PROCEDURE — 85730 THROMBOPLASTIN TIME PARTIAL: CPT

## 2022-11-24 PROCEDURE — 83036 HEMOGLOBIN GLYCOSYLATED A1C: CPT

## 2022-11-24 PROCEDURE — 85018 HEMOGLOBIN: CPT

## 2022-11-24 PROCEDURE — G1004: CPT

## 2022-11-24 PROCEDURE — 70498 CT ANGIOGRAPHY NECK: CPT | Mod: MA

## 2022-11-24 PROCEDURE — 85025 COMPLETE CBC W/AUTO DIFF WBC: CPT

## 2022-11-24 PROCEDURE — 83605 ASSAY OF LACTIC ACID: CPT

## 2022-11-24 PROCEDURE — 82330 ASSAY OF CALCIUM: CPT

## 2022-11-24 PROCEDURE — 71045 X-RAY EXAM CHEST 1 VIEW: CPT

## 2022-11-24 PROCEDURE — 80048 BASIC METABOLIC PNL TOTAL CA: CPT

## 2022-11-24 PROCEDURE — 82962 GLUCOSE BLOOD TEST: CPT

## 2022-11-24 PROCEDURE — 70496 CT ANGIOGRAPHY HEAD: CPT | Mod: MA

## 2022-11-24 PROCEDURE — 84484 ASSAY OF TROPONIN QUANT: CPT

## 2022-11-24 PROCEDURE — G0378: CPT

## 2022-11-24 PROCEDURE — 84132 ASSAY OF SERUM POTASSIUM: CPT

## 2022-11-24 PROCEDURE — 99217: CPT

## 2022-11-24 PROCEDURE — 85610 PROTHROMBIN TIME: CPT

## 2022-11-24 PROCEDURE — 82550 ASSAY OF CK (CPK): CPT

## 2022-11-24 PROCEDURE — 0042T: CPT | Mod: MA

## 2022-11-24 PROCEDURE — 99285 EMERGENCY DEPT VISIT HI MDM: CPT | Mod: 25

## 2022-11-24 PROCEDURE — 85014 HEMATOCRIT: CPT

## 2022-11-24 PROCEDURE — 84295 ASSAY OF SERUM SODIUM: CPT

## 2022-11-24 PROCEDURE — 70551 MRI BRAIN STEM W/O DYE: CPT | Mod: MG

## 2022-11-24 PROCEDURE — 80053 COMPREHEN METABOLIC PANEL: CPT

## 2022-11-24 PROCEDURE — 70450 CT HEAD/BRAIN W/O DYE: CPT | Mod: MA

## 2022-11-24 PROCEDURE — 82435 ASSAY OF BLOOD CHLORIDE: CPT

## 2022-11-24 PROCEDURE — 87635 SARS-COV-2 COVID-19 AMP PRB: CPT

## 2022-11-24 PROCEDURE — 80061 LIPID PANEL: CPT

## 2022-11-24 RX ORDER — ACETAMINOPHEN 500 MG
975 TABLET ORAL ONCE
Refills: 0 | Status: COMPLETED | OUTPATIENT
Start: 2022-11-24 | End: 2022-11-24

## 2022-11-24 RX ORDER — ATORVASTATIN CALCIUM 80 MG/1
1 TABLET, FILM COATED ORAL
Qty: 30 | Refills: 0
Start: 2022-11-24 | End: 2022-12-23

## 2022-11-24 RX ORDER — ASPIRIN/CALCIUM CARB/MAGNESIUM 324 MG
81 TABLET ORAL ONCE
Refills: 0 | Status: COMPLETED | OUTPATIENT
Start: 2022-11-24 | End: 2022-11-24

## 2022-11-24 RX ORDER — ATORVASTATIN CALCIUM 80 MG/1
40 TABLET, FILM COATED ORAL ONCE
Refills: 0 | Status: COMPLETED | OUTPATIENT
Start: 2022-11-24 | End: 2022-11-24

## 2022-11-24 RX ORDER — POTASSIUM CHLORIDE 20 MEQ
20 PACKET (EA) ORAL ONCE
Refills: 0 | Status: COMPLETED | OUTPATIENT
Start: 2022-11-24 | End: 2022-11-24

## 2022-11-24 RX ORDER — UBIDECARENONE 100 MG
1 CAPSULE ORAL
Qty: 14 | Refills: 0
Start: 2022-11-24 | End: 2022-12-07

## 2022-11-24 RX ADMIN — ATORVASTATIN CALCIUM 40 MILLIGRAM(S): 80 TABLET, FILM COATED ORAL at 09:38

## 2022-11-24 RX ADMIN — Medication 81 MILLIGRAM(S): at 09:37

## 2022-11-24 RX ADMIN — Medication 20 MILLIEQUIVALENT(S): at 09:38

## 2022-11-24 RX ADMIN — Medication 975 MILLIGRAM(S): at 04:09

## 2022-11-24 NOTE — ED CDU PROVIDER DISPOSITION NOTE - CARE PROVIDER_API CALL
Case Devries)  Neurology; Vascular Neurology  [address] [address]  [place] [address]  Phone: [phone]  Fax: [phone]  Follow Up Time: 1-3 Days

## 2022-11-24 NOTE — ED CDU PROVIDER DISPOSITION NOTE - ATTENDING APP SHARED VISIT CONTRIBUTION OF CARE
Attending note (Damian): 46y/o F with h/o HTN, asthma, pre-DM, presenting with transient episode of slurred speech and word-finding difficulty that began around 11AM yesterday; evaluated in ED yesterday as code stroke with neuro evaluation, no evidence of acute pathology on initial CTs (CT head and CTA head/neck); sent to CDU for observation, MRI per neurology team recommendations. Has remained stable in CDU, no events overnight and no recurrence of symptoms. This morning is speaking clearly, has no facial asymmetry, is moving all extremities equally and no reported new symptoms. MRI obtained and shows no acute actionable findings. Case discussed with neurology attending who advised on optimizing medical therapies to reduce stroke risk and overall presentation is concerning for TIA, and patient is stable for dc with continued outpatient evaluation and management.

## 2022-11-24 NOTE — ED ADULT NURSE REASSESSMENT NOTE - NS ED NURSE REASSESS COMMENT FT1
07.00 Am Received the Pt from  PONCE Page . Pt is Observed for Stroke like symptoms like slurred speech which is resolved now awaiting neuro consult & MRI results  Received the Pt A&OX 4 obeys commands Farrah N/V/D fever chills cp SOB   Comfort care & safety measures continued  IV site looks clean & dry no signs of infiltration noted pt denies  pain IV site .  Pt is advised to call for help  call bell with in the reach pt verbalized the understanding .  pending CDU  MD jesus . GCS 15/15 A&OX 4 PERRLA  size 3 Strong upper & lower extremities steady gait   No facial droop  No Hand Leg drop denies numbness tingling Continue to monitor  10.00  Pt is evaluated by CDU MD Damian Garrido . pt is feeling better.  Pt is discharged . Ml wilian Townsend   explained the follow up care & gave the discharge summary  . Pt has stable vitals steady gait A&OX 4 at the time of Discharge
Handoff report received from PONCE Fountain and PONCE Woodson. Pt resting comfortably in green minaya 1. Pt A&O x 4, VSS. As per MD Erickson, pt ok to eat. Pt provided crackers. Pt denies other needs at this time. Pt updated on plan of care. CDU to see patient. Stretcher locked in lowest position and side rails up.
Pt received from PONCE Pineda . Pt oriented to CDU & plan of care was discussed. Pt A&O x 4. Pt in CDU for Tele, neuro checks, MRI, neuro following, DW ED attending, vitals every 4 hours, frequent reevaluations. Pt denies any lightheadedness, headache, dizziness, weakness, numbness, visual or speech disturbances as of now. On neuro exam, A&O x 4, PERRLA R size 3mm pupil and L size 4mm pupil, R eye vision loss (baseline), EOMI, strength intact, except right arm 4/5 strength d/t known injury, sensation intact, clear speech. Pt on cardiac monitor in NSR, HR in 80's. V/S stable, pt afebrile,  IV in place, patent and free of signs of infiltration. Pt resting in bed. Safety & comfort measures maintained. Call bell in reach. Will continue to monitor.

## 2022-11-24 NOTE — ED CDU PROVIDER SUBSEQUENT DAY NOTE - HISTORY
No interval changes since initial CDU provider note. Pt feels well without complaint. NAD VSS. no events on tele. Pending MRI read in the setting of changes in speech, resolving. Neurology following.  - BEE Shelton

## 2022-11-24 NOTE — ED CDU PROVIDER DISPOSITION NOTE - PATIENT PORTAL LINK FT
You can access the FollowMyHealth Patient Portal offered by [facility] by registering at the following website: [email or web address]. By joining [facility]’s FollowMyHealth portal, you will also be able to view your health information using other applications (apps) compatible with our system.

## 2022-11-24 NOTE — ED CDU PROVIDER DISPOSITION NOTE - CLINICAL COURSE
47 year old female with past medical history HTN, Asthma, pre-diabetes, partial hysterectomy 2/2 fibroids  presents to the Emergency Department with chief complaint of slurred speech.  Patient states around 11am she developed sudden onset slurred speech and word-finding difficulty.  Patient stated she felt tingling of her right side and total body heaviness. States that she has a history of "hole in her right retina" and uveitis with blurred vision to the right eye since, diagnosed over 10 years ago. Patient also states that she was injured at work earlier this year, with a cervical spine injury/ rotator cuff injury with tingling/weakness to her RUE since. Also states that she started taking Rosuvastatin, Vit D, and COQ10 on Oct 31 and since then has increased tingling sensations to RUE. Patient denies chest pain, dyspnea, fever, or other physical complaints.    ED course: CODE STROKE. CTs negative for acute pathology. Evaluated by neurology, recommending MRIs. Plan for tele monitoring, neuro checks, and imaging in CDU. 47 year old female with past medical history HTN, Asthma, pre-diabetes, partial hysterectomy 2/2 fibroids  presents to the Emergency Department with chief complaint of slurred speech.  Patient states around 11am she developed sudden onset slurred speech and word-finding difficulty.  Patient stated she felt tingling of her right side and total body heaviness. States that she has a history of "hole in her right retina" and uveitis with blurred vision to the right eye since, diagnosed over 10 years ago. Patient also states that she was injured at work earlier this year, with a cervical spine injury/ rotator cuff injury with tingling/weakness to her RUE since. Also states that she started taking Rosuvastatin, Vit D, and COQ10 on Oct 31 and since then has increased tingling sensations to RUE. Patient denies chest pain, dyspnea, fever, or other physical complaints.    ED course: CODE STROKE. CTs negative for acute pathology. Evaluated by neurology, recommending MRIs. Plan for tele monitoring, neuro checks, and imaging in CDU.  In AM, pt feeling much better. Reports speech is back to baseline. No new symptoms. MRI with no acute findings. Patient seen by Neuro Attg Dr. Devries, concerned for TIA, recommending patient be d/c on ASA 81mg, lipitor 40mg, and coQ10 200mg daily. Also requesting ck level be sent. Patient stable for discharge. 47 year old female with past medical history HTN, Asthma, pre-diabetes, partial hysterectomy 2/2 fibroids  presents to the Emergency Department with chief complaint of slurred speech.  Patient states around 11am she developed sudden onset slurred speech and word-finding difficulty.  Patient stated she felt tingling of her right side and total body heaviness. States that she has a history of "hole in her right retina" and uveitis with blurred vision to the right eye since, diagnosed over 10 years ago. Patient also states that she was injured at work earlier this year, with a cervical spine injury/ rotator cuff injury with tingling/weakness to her RUE since. Also states that she started taking Rosuvastatin, Vit D, and COQ10 on Oct 31 and since then has increased tingling sensations to RUE. Patient denies chest pain, dyspnea, fever, or other physical complaints.    ED course: CODE STROKE. CTs negative for acute pathology. Evaluated by neurology, recommending MRIs. Plan for tele monitoring, neuro checks, and imaging in CDU.  In AM, pt feeling much better. Reports speech is back to baseline. No new symptoms. MRI with no acute findings. Patient seen by Neuro Attg Dr. Devries, concerned for TIA, recommending patient be d/c on ASA 81mg, lipitor 40mg, and coQ10 200mg daily. Also requesting ck level be sent which was WNL. Patient stable for discharge.

## 2022-11-24 NOTE — ED CDU PROVIDER SUBSEQUENT DAY NOTE - ATTENDING APP SHARED VISIT CONTRIBUTION OF CARE
Attending note (Damian): 48y/o F with h/o HTN, asthma, pre-DM, presenting with transient episode of slurred speech and word-finding difficulty that began around 11AM yesterday; evaluated in ED yesterday as code stroke with neuro evaluation, no evidence of acute pathology on initial CTs (CT head and CTA head/neck); sent to CDU for observation, MRI per neurology team recommendations. Has remained stable in CDU, no events overnight and no recurrence of symptoms. This morning is speaking clearly, has no facial asymmetry, is moving all extremities equally and no reported new symptoms. MRI obtained and shows no acute actionable findings. Case discussed with neurology attending who advised on optimizing medical therapies to reduce stroke risk and overall presentation is concerning for TIA, and patient is stable for dc with continued outpatient evaluation and management.

## 2022-11-24 NOTE — ED CDU PROVIDER SUBSEQUENT DAY NOTE - PHYSICAL EXAMINATION
GEN: Well Appearing, Nontoxic, NAD  	HEENT: NC/AT, Symm Facies. EOMI  	CV: +S1S2, RRR w/o m/g/r  	RESP: CTAB w/o w/r/r  	ABD: Soft, nt/nd  	EXT/MSK: No lower extremity edema or calf tenderness.  FROMx4  	SKIN: No visible erythema, lesions or rash  	Neuro: AOX3 with normal speech, Sensation grossly intact, strength RUE 4/5, LUE 5/5, b/l lower extremity 5/5  PSYCH: Appropriate mood and affect

## 2022-11-24 NOTE — ED CDU PROVIDER DISPOSITION NOTE - NSFOLLOWUPINSTRUCTIONS_ED_ALL_ED_FT
Hydrate.     We recommend you follow up with your primary care provider within the next 2-3 days, please bring all of your results with you.     Please return to the Emergency Department with new, worsening, or concerning symptoms, such as:  -Shortness of breath or trouble breathing  -Pressure, pain, tightness in chest  -Facial drooping, arm weakness, or speech difficulty   -Head injury or loss of consciousness   -Nonstop bleeding or an open wound     *More detailed information regarding your visit and discharge can be found by reviewing this packet Rest. Keep self well hydrated. Continue home medications as prescribed EXCEPT Crestor.     Start taking Aspirin 81mg daily and Atorvastatin 40mg daily.   Please also start Co-Q10 200mg once daily as directed by Neurologist.    Follow up with your primary care provider and Neurologist, Dr. Devries in 24-48 hours.   *Please be sure to follow up low potassium levels with your PCP.     Take copy of your printed results with you to your appointment.     Return to ER for any weakness/dizziness, numbness/tingling, change in speech or vision, problems walking or any other concerns.      ---------------------------------------------------------  Foods that contain a lot of potassium:  •Nuts, such as peanuts and pistachios.  •Seeds, such as sunflower seeds and pumpkin seeds.  •Peas, lentils, and lima beans.  •Whole grain and bran cereals and breads.  •Fresh fruits and vegetables, such as apricots, avocado, bananas, cantaloupe, kiwi, oranges, tomatoes, asparagus, and potatoes.  •Orange juice.  •Tomato juice.  •Red meats.  •Yogurt. Rest. Keep self well hydrated. Continue home medications as prescribed EXCEPT Crestor.     Start taking Aspirin 81mg daily and Atorvastatin 40mg daily.   Please also start Co-Q10 200mg once daily as directed by Neurologist.    Follow up with your primary care provider and Neurologist, Dr. Devries in 24-48 hours.   *Please be sure to follow up low potassium levels with your PCP Dr. Malone.     Take copy of your printed results with you to your appointment.     Return to ER for any new weakness, dizziness, numbness/tingling, change in speech or vision, problems walking or any other concerns.    Case Devries)  Neurology; Vascular Neurology  [address] [facility] [place], [place], [address]  [place] [address]  Phone: [phone]    ---------------------------------------------------------  Foods that contain a lot of potassium:  •Nuts, such as peanuts and pistachios.  •Seeds, such as sunflower seeds and pumpkin seeds.  •Peas, lentils, and lima beans.  •Whole grain and bran cereals and breads.  •Fresh fruits and vegetables, such as apricots, avocado, bananas, cantaloupe, kiwi, oranges, tomatoes, asparagus, and potatoes.  •Orange juice.  •Tomato juice.  •Red meats.  •Yogurt.

## 2022-11-24 NOTE — ED CDU PROVIDER SUBSEQUENT DAY NOTE - PROGRESS NOTE DETAILS
Patient seen and evaluated bedside, resting comfortably, NAD. Reports feeling much better. Reports speech is back to baseline. No new symptoms. VSS. Speech clear, face symmetric, no focal findings. MRI with no acute findings, pending neuro eval and final recs. Patient seen by Neuro Attg Dr. Devries, concerned for TIA, recommending patient be d/c on ASA 81mg, lipitor 40mg, and coQ10 200mg daily. Also requesting ck level be sent. D/w Dr. Salgado.

## 2022-11-24 NOTE — ED CDU PROVIDER SUBSEQUENT DAY NOTE - NS ED ROS FT
Constitutional: No fever or chills  	Eyes: No visual changes, eye pain   	CV: No chest pain or lower extremity edema  	Resp: No SOB no cough  	GI: No abd pain. No nausea or vomiting.   	: No dysuria, hematuria.   	MSK: No musculoskeletal pain  	Skin: No rash  	Psych: +history of anxiety   	Neuro: +word finding difficulty + numbness/ tingling. No new weakness.  Endo: +pre-DM

## 2022-11-30 ENCOUNTER — APPOINTMENT (OUTPATIENT)
Dept: INTERNAL MEDICINE | Facility: CLINIC | Age: 47
End: 2022-11-30

## 2022-11-30 VITALS
DIASTOLIC BLOOD PRESSURE: 80 MMHG | HEART RATE: 98 BPM | SYSTOLIC BLOOD PRESSURE: 120 MMHG | TEMPERATURE: 97 F | OXYGEN SATURATION: 98 % | WEIGHT: 192 LBS | BODY MASS INDEX: 34.56 KG/M2

## 2022-11-30 PROCEDURE — 99214 OFFICE O/P EST MOD 30 MIN: CPT | Mod: 25

## 2022-11-30 RX ORDER — ROSUVASTATIN CALCIUM 5 MG/1
5 TABLET, FILM COATED ORAL
Qty: 90 | Refills: 3 | Status: DISCONTINUED | COMMUNITY
Start: 2022-11-01 | End: 2022-11-30

## 2022-11-30 RX ORDER — HYDROCHLOROTHIAZIDE 50 MG/1
50 TABLET ORAL
Qty: 90 | Refills: 3 | Status: DISCONTINUED | COMMUNITY
Start: 2018-08-23 | End: 2022-11-30

## 2022-11-30 RX ORDER — POTASSIUM CHLORIDE 750 MG/1
10 TABLET, EXTENDED RELEASE ORAL
Qty: 90 | Refills: 3 | Status: COMPLETED | COMMUNITY
Start: 2021-03-04 | End: 2022-11-30

## 2022-11-30 NOTE — ASSESSMENT
[FreeTextEntry1] : Cryptogenic TIA without elevated BP, no athero,no vascular stenosis,  no embolic event so r/o vasculitis\par Vasculitis workup\par Panuveitis unknown cause. In 2012  JUDY + but dsDNA negative 2012\par Agree with high-intensity statin and ASA 81\par CTA\par \par HTN  low K   incr MACARIO 20meq  neede pot depite ARB\par change HCTZ to chlorthalidone longer half life

## 2022-11-30 NOTE — HISTORY OF PRESENT ILLNESS
[FreeTextEntry8] : 48 yo B woman HTN presented with slurred speech and L arm numbness and heaviness\par \par Evaluated by Neurology Code \par CTA head neck no stenosis no thrombus no aneurysm no bleed no atherosclerosis\par BP NORMAL\par NSR\par In the past Panuveiitis  No SLE JUDY + but dsDNA negative No Syphylis\par Saw rheumatology [place]\par \par In 2012 had retinopathy and OD  uveiitis  Uveitis W/U negative S/P vitrectomy and cataract removal\par Idiopathic retinopathy. No SLE found\par \par Pfizer x2 \par statin increased and ASA added\par

## 2022-12-01 LAB — ERYTHROCYTE [SEDIMENTATION RATE] IN BLOOD BY WESTERGREN METHOD: 31 MM/HR

## 2022-12-02 LAB — DSDNA AB SER-ACNC: 13 IU/ML

## 2022-12-05 LAB
ANA PAT FLD IF-IMP: NORMAL
ANACR T: ABNORMAL

## 2022-12-29 ENCOUNTER — APPOINTMENT (OUTPATIENT)
Dept: RHEUMATOLOGY | Facility: CLINIC | Age: 47
End: 2022-12-29
Payer: COMMERCIAL

## 2022-12-29 VITALS
OXYGEN SATURATION: 98 % | HEIGHT: 63 IN | BODY MASS INDEX: 34.02 KG/M2 | TEMPERATURE: 97.6 F | SYSTOLIC BLOOD PRESSURE: 129 MMHG | WEIGHT: 192 LBS | HEART RATE: 91 BPM | RESPIRATION RATE: 16 BRPM | DIASTOLIC BLOOD PRESSURE: 85 MMHG

## 2022-12-29 LAB
APTT BLD: 34.6 SEC
CK SERPL-CCNC: 158 U/L
DEPRECATED KAPPA LC FREE/LAMBDA SER: 4.22 RATIO
ERYTHROCYTE [SEDIMENTATION RATE] IN BLOOD BY WESTERGREN METHOD: 13 MM/HR
IGA SER QL IEP: 225 MG/DL
IGG SER QL IEP: 1924 MG/DL
IGM SER QL IEP: 83 MG/DL
INR PPP: 1.06 RATIO
KAPPA LC CSF-MCNC: 1.61 MG/DL
KAPPA LC SERPL-MCNC: 6.8 MG/DL
PT BLD: 12.4 SEC
RHEUMATOID FACT SER QL: <10 IU/ML

## 2022-12-29 PROCEDURE — 99205 OFFICE O/P NEW HI 60 MIN: CPT

## 2022-12-29 NOTE — HISTORY OF PRESENT ILLNESS
[FreeTextEntry1] : 1.  JUDY Positive: To evaluate the neurologic abnormalities detailed below, JUDY and DNA were performed.  The JUDY was 1/320 (DFS), and the DNA was negative.  Of note was a slightly elevated PTT.  No prior history of thrombosis or obstetrical issues. No arthritis, rash, serositis. \par 2. Uveitis: chronic uveitis affecting the right eye since 2012.  Its etiology not understood. \par 3. Neurologic abnormalities (presumed TIA): Just before Thanksgiving 2022, she developed right-sided weakness and numbness along with slurred speech for which she was extensively evaluated at [facility]. CT, MR, CTA all unrevealing except for some non-specific white matter abnormalities. No diagnosis or treatment given.  No recurrences. \par 4. OB history: 3 elective abortions; 1 child born in 1991-full term vaginal delivery without complications. \par 5. Lumbar spine injury\par 6. Hypertension\par \par Meds\par atorvastatin 40 mg/d\par potassium 40 mg/d\par chlorthalidone 25 mg/d\par amlodipine 5 mg/d\par CoQ10\par vit D\par telmisartan 80 mg/d\par Alphagan\par timolol\par Durezol eye drops

## 2022-12-29 NOTE — ASSESSMENT
[FreeTextEntry1] : 1.  JUDY Positive: To evaluate the neurologic abnormalities detailed below, JUDY and DNA were performed.  The JUDY was 1/320 (DFS), and the DNA was negative.  Of note was a slightly elevated PTT.  No prior history of thrombosis or obstetrical issues. No arthritis, rash, serositis. \par 2. Uveitis: chronic uveitis affecting the right eye since 2012.  Its etiology not understood. \par 3. Neurologic abnormalities (presumed TIA): Just before Thanksgiving 2022, she developed right-sided weakness and numbness along with slurred speech for which she was extensively evaluated at [facility]. CT, MR, CTA all unrevealing except for some non-specific white matter abnormalities. No diagnosis or treatment given.  No recurrences. \par 4. OB history: 3 elective abortions; 1 child born in 1991-full term vaginal delivery without complications. \par 5. Lumbar spine injury\par 6. Hypertension\par \par Plan:\par 1.  Lab tests\par 2.  Reviewed internal and/or external records of other providers\par 3.  Contact me 1 week\par

## 2022-12-29 NOTE — PHYSICAL EXAM
[General Appearance - Well Nourished] : well nourished [General Appearance - Well-Appearing] : healthy appearing [Sclera] : the sclera and conjunctiva were normal [Strabismus] : no strabismus was seen [Outer Ear] : the ears and nose were normal in appearance [Neck Appearance] : the appearance of the neck was normal [] : no respiratory distress [Heart Rate And Rhythm] : heart rate was normal and rhythm regular [Murmurs] : no murmurs [Arterial Pulses Carotid] : carotid pulses were normal with no bruits [Full Pulse] : the pedal pulses are present [Edema] : there was no peripheral edema [Veins - Varicosity Changes] : there were no varicosital changes [Abdomen Soft] : soft [Abdomen Tenderness] : non-tender [Cervical Lymph Nodes Enlarged Posterior Bilaterally] : posterior cervical [Cervical Lymph Nodes Enlarged Anterior Bilaterally] : anterior cervical [No CVA Tenderness] : no ~M costovertebral angle tenderness [No Spinal Tenderness] : no spinal tenderness [FreeTextEntry1] : excellent flexion and extension [Abnormal Walk] : normal gait [Nail Clubbing] : no clubbing  or cyanosis of the fingernails [Musculoskeletal - Swelling] : no joint swelling seen [Motor Tone] : muscle strength and tone were normal [Skin Color & Pigmentation] : normal skin color and pigmentation [Sensation] : the sensory exam was normal to light touch and pinprick [Motor Exam] : the motor exam was normal [Oriented To Time, Place, And Person] : oriented to person, place, and time [Impaired Insight] : insight and judgment were intact [Affect] : the affect was normal

## 2022-12-30 LAB
ACE BLD-CCNC: 30 U/L
B2 GLYCOPROT1 IGA SERPL IA-ACNC: <5 SAU
B2 GLYCOPROT1 IGG SER-ACNC: <5 SGU
B2 GLYCOPROT1 IGM SER-ACNC: <5 SMU
C3 SERPL-MCNC: 138 MG/DL
C4 SERPL-MCNC: 44 MG/DL
CARDIOLIPIN IGM SER-MCNC: 6 MPL
CARDIOLIPIN IGM SER-MCNC: <5 GPL
CHROMATIN AB SERPL-ACNC: <0.2 AL
CONFIRM: 31.3 SEC
DEPRECATED CARDIOLIPIN IGA SER: <5 APL
DRVVT IMM 1:2 NP PPP: NORMAL
DRVVT SCREEN TO CONFIRM RATIO: 1.12 RATIO
ENA RNP AB SER IA-ACNC: <0.2 AL
ENA SM AB SER IA-ACNC: <0.2 AL
ENA SS-A AB SER IA-ACNC: <0.2 AL
ENA SS-B AB SER IA-ACNC: <0.2 AL
RIBOSOMAL P AB SER IA-ACNC: <0.2 AL
SCREEN DRVVT: 41.6 SEC
SILICA CLOTTING TIME INTERPRETATION: NORMAL
SILICA CLOTTING TIME S/C: 1.05 RATIO
THYROGLOB AB SERPL-ACNC: <20 IU/ML
THYROPEROXIDASE AB SERPL IA-ACNC: <10 IU/ML

## 2022-12-31 LAB
CCP AB SER IA-ACNC: <8 UNITS
IGE SER-MCNC: 49 KU/L
PROTEINASE3 AB SER IA-ACNC: 6.5 UNITS
PROTEINASE3 AB SER-ACNC: NEGATIVE
RF+CCP IGG SER-IMP: NEGATIVE

## 2023-01-01 LAB
M TB IFN-G BLD-IMP: NEGATIVE
QUANTIFERON TB PLUS MITOGEN MINUS NIL: >10 IU/ML
QUANTIFERON TB PLUS NIL: 0.04 IU/ML
QUANTIFERON TB PLUS TB1 MINUS NIL: 0.01 IU/ML
QUANTIFERON TB PLUS TB2 MINUS NIL: 0 IU/ML

## 2023-01-02 LAB
PS IGA SER QL: <1
PS IGG SER QL: 9 UNITS
PS IGM SER QL: 12 UNITS

## 2023-01-03 LAB
MYELOPEROXIDASE AB SER QL IA: <5 UNITS
MYELOPEROXIDASE CELLS FLD QL: NEGATIVE

## 2023-01-04 LAB — HISTONE AB SER QL: 0.5 UNITS

## 2023-01-05 LAB
M PROTEIN SPEC IFE-MCNC: NORMAL
PS-PROTHROM CMPLX IGG SERPL IA-ACNC: <9.4 U
PS-PROTHROM CMPLX IGM SERPL IA-ACNC: 12.6 U

## 2023-02-03 ENCOUNTER — OUTPATIENT (OUTPATIENT)
Dept: OUTPATIENT SERVICES | Facility: HOSPITAL | Age: 48
LOS: 1 days | Discharge: HOME | End: 2023-02-03

## 2023-02-03 DIAGNOSIS — D47.2 MONOCLONAL GAMMOPATHY: ICD-10-CM

## 2023-02-08 ENCOUNTER — RESULT REVIEW (OUTPATIENT)
Age: 48
End: 2023-02-08

## 2023-02-08 ENCOUNTER — NON-APPOINTMENT (OUTPATIENT)
Age: 48
End: 2023-02-08

## 2023-02-08 ENCOUNTER — APPOINTMENT (OUTPATIENT)
Dept: HEMATOLOGY ONCOLOGY | Facility: CLINIC | Age: 48
End: 2023-02-08
Payer: COMMERCIAL

## 2023-02-08 VITALS
SYSTOLIC BLOOD PRESSURE: 123 MMHG | BODY MASS INDEX: 34.6 KG/M2 | WEIGHT: 195.33 LBS | DIASTOLIC BLOOD PRESSURE: 85 MMHG | HEART RATE: 110 BPM | RESPIRATION RATE: 16 BRPM | TEMPERATURE: 97.6 F | OXYGEN SATURATION: 98 %

## 2023-02-08 LAB
BASOPHILS # BLD AUTO: 0.03 K/UL — SIGNIFICANT CHANGE UP (ref 0–0.2)
BASOPHILS NFR BLD AUTO: 0.5 % — SIGNIFICANT CHANGE UP (ref 0–2)
EOSINOPHIL # BLD AUTO: 0.11 K/UL — SIGNIFICANT CHANGE UP (ref 0–0.5)
EOSINOPHIL NFR BLD AUTO: 1.8 % — SIGNIFICANT CHANGE UP (ref 0–6)
HCT VFR BLD CALC: 37.3 % — SIGNIFICANT CHANGE UP (ref 34.5–45)
HGB BLD-MCNC: 13 G/DL — SIGNIFICANT CHANGE UP (ref 11.5–15.5)
IMM GRANULOCYTES NFR BLD AUTO: 0.5 % — SIGNIFICANT CHANGE UP (ref 0–0.9)
LYMPHOCYTES # BLD AUTO: 3.58 K/UL — HIGH (ref 1–3.3)
LYMPHOCYTES # BLD AUTO: 58.1 % — HIGH (ref 13–44)
MCHC RBC-ENTMCNC: 28.7 PG — SIGNIFICANT CHANGE UP (ref 27–34)
MCHC RBC-ENTMCNC: 34.9 G/DL — SIGNIFICANT CHANGE UP (ref 32–36)
MCV RBC AUTO: 82.3 FL — SIGNIFICANT CHANGE UP (ref 80–100)
MONOCYTES # BLD AUTO: 0.37 K/UL — SIGNIFICANT CHANGE UP (ref 0–0.9)
MONOCYTES NFR BLD AUTO: 6 % — SIGNIFICANT CHANGE UP (ref 2–14)
NEUTROPHILS # BLD AUTO: 2.04 K/UL — SIGNIFICANT CHANGE UP (ref 1.8–7.4)
NEUTROPHILS NFR BLD AUTO: 33.1 % — LOW (ref 43–77)
NRBC # BLD: 0 /100 WBCS — SIGNIFICANT CHANGE UP (ref 0–0)
PLATELET # BLD AUTO: 370 K/UL — SIGNIFICANT CHANGE UP (ref 150–400)
RBC # BLD: 4.53 M/UL — SIGNIFICANT CHANGE UP (ref 3.8–5.2)
RBC # FLD: 13.2 % — SIGNIFICANT CHANGE UP (ref 10.3–14.5)
WBC # BLD: 6.16 K/UL — SIGNIFICANT CHANGE UP (ref 3.8–10.5)
WBC # FLD AUTO: 6.16 K/UL — SIGNIFICANT CHANGE UP (ref 3.8–10.5)

## 2023-02-08 PROCEDURE — 99204 OFFICE O/P NEW MOD 45 MIN: CPT

## 2023-02-08 RX ORDER — CHROMIUM 200 MCG
1000 TABLET ORAL DAILY
Qty: 90 | Refills: 3 | Status: DISCONTINUED | COMMUNITY
Start: 2017-07-13 | End: 2023-02-08

## 2023-02-08 RX ORDER — SERTRALINE HYDROCHLORIDE 50 MG/1
50 TABLET, FILM COATED ORAL DAILY
Qty: 90 | Refills: 3 | Status: DISCONTINUED | COMMUNITY
Start: 2021-06-01 | End: 2023-02-08

## 2023-02-08 RX ORDER — MELOXICAM 15 MG/1
15 TABLET ORAL DAILY
Qty: 30 | Refills: 11 | Status: DISCONTINUED | COMMUNITY
Start: 2021-06-01 | End: 2023-02-08

## 2023-02-08 RX ORDER — TIMOLOL MALEATE 5 MG/ML
0.5 SOLUTION OPHTHALMIC
Qty: 5 | Refills: 0 | Status: DISCONTINUED | COMMUNITY
Start: 2018-03-13 | End: 2023-02-08

## 2023-02-08 RX ORDER — ATORVASTATIN CALCIUM 40 MG/1
40 TABLET, FILM COATED ORAL
Qty: 1 | Refills: 3 | Status: DISCONTINUED | COMMUNITY
End: 2023-02-08

## 2023-02-09 NOTE — CONSULT LETTER
[Dear  ___] : Dear ~NIURKA, [Consult Letter:] : I had the pleasure of evaluating your patient, [unfilled]. [Please see my note below.] : Please see my note below. [Consult Closing:] : Thank you very much for allowing me to participate in the care of this patient.  If you have any questions, please do not hesitate to contact me. [Sincerely,] : Sincerely, [FreeTextEntry2] : Malcolm Malone MD [FreeTextEntry3] : Addi\par Mack Nunez M.D., FACP\par Professor of Medicine\par [facility] [employer] [facility]\par Associate Chief, Division of Hematology\par [facility]\par [facility]\par [address] [place] [address]\[place] [address]\par [phone]\par \par \par \par   [DrDiana  ___] : Dr. MORROW

## 2023-02-09 NOTE — ADDENDUM
[FreeTextEntry1] : I, Kurt Andrade, acted solely as a scribe for Dr. Mack Nunez on 02/08/2023. All medical entries made by the Scribe were at my, Dr. Mack Nunez's, direction and personally dictated by me on 02/08/2023. I have reviewed the chart and agree that the record accurately reflects my personal performance of the history, physical exam, assessment and plan. I have also personally directed, reviewed, and agreed with the chart.

## 2023-02-09 NOTE — ASSESSMENT
[FreeTextEntry1] : 46 YO F found to have IgG kappa monoclonal gammopathy in the course of evaluation for a positive JUDY. 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 This raises the possibility of an underlying lymphoproliferate disorder. I will evaluate that further. It is of note that her mother also has IgG kappa MGUS. This condition is not hereditary, but first degree relatives are at increased risk of developing it. I reviewed monoclonal gammopathy with her and I explained that, if it is not related to a lymphoproliferate disorder, that it is felt to be a part of the normal aging process. I reviewed that monitoring is needed since some affected individuals evolve to multiple myeloma or non-Hodgkins lymphoma.\par \par Should her lymphocyte population prove to be normal, another explanation is that rather than lymphocytosis, she has mild neutropenia. Benign neutropenia is common in black Americans. These individuals are not at increased risk for infection. Her ANC is adequate.\par \par She is hematologically cleared for the planned right shoulder arthroscopy. \par \par Plan:\par CMP, LDH, SPEP, SPIEP, SFLC, Beta 2 microglobulins, CRP, Cryoglobulins\par Urine Bence Magaña protein\par Flow cytometry\par Consider [facility] blood group screening if flow cytometry normal\par Call me in 2 weeks [Palliative Care Plan] : not applicable at this time

## 2023-02-09 NOTE — RESULTS/DATA
[FreeTextEntry1] : WBC 6160, Hgb 13.0, Hct 37.3, Platelets 370,000, Diff 33P, 58L, 6M, 1 Imm, 2 Eos, 1 Ba, ANC 2040

## 2023-02-09 NOTE — HISTORY OF PRESENT ILLNESS
[de-identified] : IgGk Monoclonal gammopathy of uncertain significance [de-identified] : 48 YO F referred in consultation regarding monoclonal gammopathy. In November, 2022, she had a TIA. In the course of evaluation, she was noted to have a positive JUDY. Further evaluation of this in December, 2022 revealed upon serum protein immunoelectrophoresis an IgG kappa monoclonal gammopathy with quantitative IgG 1924, IgA 225, IgM 83 and serum free light chain ratio 4.22. Review of her chart revealed that she has had a relative lymphocytosis since at least December, 2011 with approximately 30% neutrophils and 60% lymphocytes. \par \par She feels well. She has palpitations when she is nervous. These are not accompanied by lightheadedness nor chest pain. She has had no further TIA episodes. Her left fingers tingle, but there is no weakness in the hand. She has no headaches, new visual problems, fever, night sweats, swollen glands, sore throats, recurrent infections, chest pain, SOB, abdominal pain, skeletal pain, bleeding, weight loss. She bruises easily. Menses were heavy due to fibroids in past prior to her hysterectomy.\par \par  She is having right shoulder arthroscopy tomorrow for a torn rotator cuff. \par \par Her mother, my patient, also has IgG kappa monoclonal gammopathy of uncertain significance.

## 2023-02-09 NOTE — REVIEW OF SYSTEMS
[Vision Problems] : vision problems [Palpitations] : palpitations [Joint Pain] : joint pain [Easy Bruising] : a tendency for easy bruising [Negative] : Allergic/Immunologic [de-identified] : Left fingers tingle

## 2023-02-09 NOTE — PHYSICAL EXAM
[Fully active, able to carry on all pre-disease performance without restriction] : Status 0 - Fully active, able to carry on all pre-disease performance without restriction [Normal] : affect appropriate [de-identified] : Tinel sign negative bilaterally [de-identified] : Octavioos

## 2023-02-10 LAB
ALBUMIN SERPL ELPH-MCNC: 4.7 G/DL
ALP BLD-CCNC: 64 U/L
ALT SERPL-CCNC: 15 U/L
ANION GAP SERPL CALC-SCNC: 15 MMOL/L
AST SERPL-CCNC: 16 U/L
B2 MICROGLOB SERPL-MCNC: 1.7 MG/L
BILIRUB SERPL-MCNC: 0.6 MG/DL
BUN SERPL-MCNC: 16 MG/DL
CALCIUM SERPL-MCNC: 10.2 MG/DL
CHLORIDE SERPL-SCNC: 101 MMOL/L
CO2 SERPL-SCNC: 24 MMOL/L
CREAT SERPL-MCNC: 0.95 MG/DL
CRP SERPL-MCNC: 5 MG/L
EGFR: 74 ML/MIN/1.73M2
GLUCOSE SERPL-MCNC: 97 MG/DL
LDH SERPL-CCNC: 234 U/L
POTASSIUM SERPL-SCNC: 3.9 MMOL/L
PROT SERPL-MCNC: 7.7 G/DL
SODIUM SERPL-SCNC: 140 MMOL/L

## 2023-02-13 LAB
ALBUMIN MFR SERPL ELPH: 55.8 %
ALBUMIN SERPL-MCNC: 4.4 G/DL
ALBUMIN/GLOB SERPL: 1.3 RATIO
ALPHA1 GLOB MFR SERPL ELPH: 4.2 %
ALPHA1 GLOB SERPL ELPH-MCNC: 0.3 G/DL
ALPHA2 GLOB MFR SERPL ELPH: 8 %
ALPHA2 GLOB SERPL ELPH-MCNC: 0.6 G/DL
B-GLOBULIN MFR SERPL ELPH: 10.5 %
B-GLOBULIN SERPL ELPH-MCNC: 0.8 G/DL
CRYOGLOB SERPL-MCNC: NEGATIVE
DEPRECATED KAPPA LC FREE/LAMBDA SER: 3.89 RATIO
GAMMA GLOB FLD ELPH-MCNC: 1.7 G/DL
GAMMA GLOB MFR SERPL ELPH: 21.5 %
IGA SER QL IEP: 207 MG/DL
IGG SER QL IEP: 1871 MG/DL
IGM SER QL IEP: 78 MG/DL
INTERPRETATION SERPL IEP-IMP: NORMAL
KAPPA LC CSF-MCNC: 1.54 MG/DL
KAPPA LC SERPL-MCNC: 5.99 MG/DL
M PROTEIN MFR SERPL ELPH: 7 %
M PROTEIN SPEC IFE-MCNC: NORMAL
MONOCLON BAND OBS SERPL: 0.5 G/DL
PROT SERPL-MCNC: 7.8 G/DL
PROT SERPL-MCNC: 7.8 G/DL

## 2023-02-15 ENCOUNTER — NON-APPOINTMENT (OUTPATIENT)
Age: 48
End: 2023-02-15

## 2023-02-15 ENCOUNTER — APPOINTMENT (OUTPATIENT)
Dept: OPHTHALMOLOGY | Facility: CLINIC | Age: 48
End: 2023-02-15
Payer: COMMERCIAL

## 2023-02-15 PROCEDURE — 92012 INTRM OPH EXAM EST PATIENT: CPT

## 2023-02-15 PROCEDURE — 92133 CPTRZD OPH DX IMG PST SGM ON: CPT

## 2023-02-20 LAB
ALBUPE: 15.1 %
ALPHA1UPE: 22.1 %
ALPHA2UPE: 22.7 %
BETAUPE: 16.7 %
CREAT 24H UR-MCNC: NORMAL G/24 H
CREATININE UR (MAYO): 54 MG/DL
GAMMAUPE: 23.4 %
IGA 24H UR QL IFE: NORMAL
KAPPA LC 24H UR QL: NORMAL
PROT PATTERN 24H UR ELPH-IMP: NORMAL
PROT UR-MCNC: 6 MG/DL
PROT UR-MCNC: 6 MG/DL
SPECIMEN VOL 24H UR: NORMAL ML

## 2023-04-12 DIAGNOSIS — Z79.899 OTHER LONG TERM (CURRENT) DRUG THERAPY: ICD-10-CM

## 2023-04-12 DIAGNOSIS — E87.6 HYPOKALEMIA: ICD-10-CM

## 2023-05-18 ENCOUNTER — APPOINTMENT (OUTPATIENT)
Dept: INTERNAL MEDICINE | Facility: CLINIC | Age: 48
End: 2023-05-18
Payer: COMMERCIAL

## 2023-05-18 ENCOUNTER — NON-APPOINTMENT (OUTPATIENT)
Age: 48
End: 2023-05-18

## 2023-05-18 ENCOUNTER — RESULT REVIEW (OUTPATIENT)
Age: 48
End: 2023-05-18

## 2023-05-18 VITALS
TEMPERATURE: 97.9 F | HEART RATE: 68 BPM | WEIGHT: 199 LBS | OXYGEN SATURATION: 99 % | BODY MASS INDEX: 35.26 KG/M2 | HEIGHT: 63 IN | DIASTOLIC BLOOD PRESSURE: 78 MMHG | SYSTOLIC BLOOD PRESSURE: 116 MMHG

## 2023-05-18 VITALS — SYSTOLIC BLOOD PRESSURE: 104 MMHG | DIASTOLIC BLOOD PRESSURE: 72 MMHG

## 2023-05-18 DIAGNOSIS — R10.11 RIGHT UPPER QUADRANT PAIN: ICD-10-CM

## 2023-05-18 DIAGNOSIS — D50.0 IRON DEFICIENCY ANEMIA SECONDARY TO BLOOD LOSS (CHRONIC): ICD-10-CM

## 2023-05-18 DIAGNOSIS — R07.89 OTHER CHEST PAIN: ICD-10-CM

## 2023-05-18 DIAGNOSIS — F41.9 ANXIETY DISORDER, UNSPECIFIED: ICD-10-CM

## 2023-05-18 DIAGNOSIS — E78.00 PURE HYPERCHOLESTEROLEMIA, UNSPECIFIED: ICD-10-CM

## 2023-05-18 DIAGNOSIS — F43.10 POST-TRAUMATIC STRESS DISORDER, UNSPECIFIED: ICD-10-CM

## 2023-05-18 PROCEDURE — 99214 OFFICE O/P EST MOD 30 MIN: CPT

## 2023-05-18 NOTE — ASSESSMENT
[FreeTextEntry1] : DDX  Hiatal Hernia\par Esophageal spasm\par Esophagitis\par esophageal foreign body passed\par gallstones\par Looked at ECG for atypical angina in premenopausal female\par \par \par Leukocytosis intermittent\par MGUS  follow for lymphoproliferative conversion/MM/NHL\par S/P TIA   Followup Hematology\par \par S/P TIA      was told by Neurology to halve her statin\par Continue current dose and check today\par ECG No ischemic changes\par \par Declines SSRI  Does not want weight gain\par Receptive to therapy followup

## 2023-05-18 NOTE — PHYSICAL EXAM
[de-identified] : negative crowing rooster for costochondritis [de-identified] : epigastric tenderness  NO RUQ tenderness

## 2023-05-18 NOTE — HISTORY OF PRESENT ILLNESS
[FreeTextEntry8] : 49 yo B woman\par IgG MGUS\par  HTN presented with slurred speech and L arm numbness and heaviness\par \par Evaluated by Neurology Code \par CTA head neck no stenosis no thrombus no aneurysm no bleed no atherosclerosis\par BP NORMAL\par JUDY and MGUS discovered  (mother MGUS), lymphocytosis\par NSR\par In the past Panuveiitis  No SLE JUDY + but dsDNA negative No Syphylis\par Saw rheumatology [place]\par \par In 2012 had retinopathy and OD  uveiitis  Uveitis W/U negative S/P vitrectomy because of retinal hole and retinal hemorrhaging and cataract removal  Dr Sanchez Luis\par Idiopathic retinopathy. 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 No dysphagia, no blood, no weight loss, no odynophagia. Feels sharp and it is moving. Has not recurred yesterday nor today.\par Happened from May 14-16. No relation to exercise, eating. Worse when bending forward. Has since resolved and has not recurred. This never happened before.\par

## 2023-05-19 LAB
ALBUMIN SERPL ELPH-MCNC: 4.5 G/DL
ALP BLD-CCNC: 63 U/L
ALT SERPL-CCNC: 15 U/L
ANION GAP SERPL CALC-SCNC: 13 MMOL/L
AST SERPL-CCNC: 16 U/L
BILIRUB SERPL-MCNC: 0.6 MG/DL
BUN SERPL-MCNC: 15 MG/DL
CALCIUM SERPL-MCNC: 10.4 MG/DL
CHLORIDE SERPL-SCNC: 103 MMOL/L
CHOLEST SERPL-MCNC: 187 MG/DL
CO2 SERPL-SCNC: 27 MMOL/L
CREAT SERPL-MCNC: 0.88 MG/DL
EGFR: 81 ML/MIN/1.73M2
ESTIMATED AVERAGE GLUCOSE: 128 MG/DL
GLUCOSE SERPL-MCNC: 128 MG/DL
HBA1C MFR BLD HPLC: 6.1 %
HDLC SERPL-MCNC: 69 MG/DL
LDLC SERPL CALC-MCNC: 95 MG/DL
NONHDLC SERPL-MCNC: 118 MG/DL
POTASSIUM SERPL-SCNC: 3.4 MMOL/L
PROT SERPL-MCNC: 7.8 G/DL
SODIUM SERPL-SCNC: 142 MMOL/L
TRIGL SERPL-MCNC: 111 MG/DL

## 2023-05-19 RX ORDER — COVID-19 ANTIGEN TEST
KIT MISCELLANEOUS
Qty: 4 | Refills: 0 | Status: COMPLETED | COMMUNITY
Start: 2023-02-07

## 2023-05-19 RX ORDER — OXYCODONE AND ACETAMINOPHEN 10; 325 MG/1; MG/1
10-325 TABLET ORAL
Qty: 42 | Refills: 0 | Status: COMPLETED | COMMUNITY
Start: 2023-02-06

## 2023-05-19 RX ORDER — ASPIRIN ENTERIC COATED TABLETS 81 MG 81 MG/1
81 TABLET, DELAYED RELEASE ORAL DAILY
Refills: 0 | Status: ACTIVE | COMMUNITY
Start: 2023-02-08

## 2023-05-22 LAB
CK BB SERPL ELPH-CCNC: 0 %
CK MB CFR SERPL ELPH: 0 %
CK MM SERPL ELPH-CCNC: 100 %
CREATINE KINASE,TOTAL,SERUM: 170 U/L
MACRO TYPE 1: 0 %
MACRO TYPE 2: 0 %

## 2023-05-23 ENCOUNTER — OUTPATIENT (OUTPATIENT)
Dept: OUTPATIENT SERVICES | Facility: HOSPITAL | Age: 48
LOS: 1 days | End: 2023-05-23
Payer: COMMERCIAL

## 2023-05-23 ENCOUNTER — TRANSCRIPTION ENCOUNTER (OUTPATIENT)
Age: 48
End: 2023-05-23

## 2023-05-23 DIAGNOSIS — R07.89 OTHER CHEST PAIN: ICD-10-CM

## 2023-05-23 PROCEDURE — 71046 X-RAY EXAM CHEST 2 VIEWS: CPT | Mod: 26

## 2023-05-31 ENCOUNTER — OUTPATIENT (OUTPATIENT)
Dept: OUTPATIENT SERVICES | Facility: HOSPITAL | Age: 48
LOS: 1 days | End: 2023-05-31

## 2023-05-31 ENCOUNTER — TRANSCRIPTION ENCOUNTER (OUTPATIENT)
Age: 48
End: 2023-05-31

## 2023-05-31 ENCOUNTER — APPOINTMENT (OUTPATIENT)
Dept: ULTRASOUND IMAGING | Facility: CLINIC | Age: 48
End: 2023-05-31
Payer: COMMERCIAL

## 2023-05-31 DIAGNOSIS — Z00.8 ENCOUNTER FOR OTHER GENERAL EXAMINATION: ICD-10-CM

## 2023-05-31 PROCEDURE — 76705 ECHO EXAM OF ABDOMEN: CPT | Mod: 26

## 2023-06-12 ENCOUNTER — APPOINTMENT (OUTPATIENT)
Dept: OPHTHALMOLOGY | Facility: CLINIC | Age: 48
End: 2023-06-12

## 2023-06-22 ENCOUNTER — TRANSCRIPTION ENCOUNTER (OUTPATIENT)
Age: 48
End: 2023-06-22

## 2023-06-28 NOTE — ED ADULT NURSE NOTE - CAS TRG GENERAL AIRWAY, MLM
Patent Benzoyl Peroxide Pregnancy And Lactation Text: This medication is Pregnancy Category C. It is unknown if benzoyl peroxide is excreted in breast milk.

## 2023-06-30 ENCOUNTER — TRANSCRIPTION ENCOUNTER (OUTPATIENT)
Age: 48
End: 2023-06-30

## 2023-07-18 ENCOUNTER — APPOINTMENT (OUTPATIENT)
Dept: INTERNAL MEDICINE | Facility: CLINIC | Age: 48
End: 2023-07-18
Payer: COMMERCIAL

## 2023-07-18 VITALS
WEIGHT: 194 LBS | OXYGEN SATURATION: 98 % | BODY MASS INDEX: 34.38 KG/M2 | SYSTOLIC BLOOD PRESSURE: 130 MMHG | HEART RATE: 72 BPM | HEIGHT: 63 IN | DIASTOLIC BLOOD PRESSURE: 90 MMHG

## 2023-07-18 VITALS — SYSTOLIC BLOOD PRESSURE: 116 MMHG | DIASTOLIC BLOOD PRESSURE: 80 MMHG

## 2023-07-18 DIAGNOSIS — I10 ESSENTIAL (PRIMARY) HYPERTENSION: ICD-10-CM

## 2023-07-18 DIAGNOSIS — Z91.89 OTHER SPECIFIED PERSONAL RISK FACTORS, NOT ELSEWHERE CLASSIFIED: ICD-10-CM

## 2023-07-18 DIAGNOSIS — K21.9 GASTRO-ESOPHAGEAL REFLUX DISEASE W/OUT ESOPHAGITIS: ICD-10-CM

## 2023-07-18 PROCEDURE — 99214 OFFICE O/P EST MOD 30 MIN: CPT

## 2023-07-18 NOTE — ASSESSMENT
[FreeTextEntry1] : Abdominal Discomfort Chest epigastric  GERD Instructions and H2 blocker  H2 blocker ONLY 2 WEEKS\par GI pending\par \par HTN controlled

## 2023-07-18 NOTE — HISTORY OF PRESENT ILLNESS
[FreeTextEntry8] : 47 yo B woman\par IgG MGUS\par  HTN presented with slurred speech and L arm numbness and heaviness\par \par Evaluated by Neurology Code \par CTA head neck no stenosis no thrombus no aneurysm no bleed no atherosclerosis\par BP NORMAL\par JUDY and MGUS discovered  (mother MGUS), lymphocytosis\par NSR\par In the past Panuveiitis  No SLE JUDY + but dsDNA negative No Syphylis\par Saw rheumatology [place]\par \par In 2012 had retinopathy and OD  uveiitis  Uveitis W/U negative S/P vitrectomy because of retinal hole and retinal hemorrhaging and cataract removal  Dr Sanchez Luis\par Idiopathic retinopathy. 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 No dysphagia, no blood, no weight loss, no odynophagia. Feels sharp and it is moving. Has not recurred yesterday nor today.\par Happened from May 14-16. No relation to exercise, eating. Worse when bending forward. Has since resolved and has not recurred. This never happened before.\par \par \par Depression declined SSRI  seeing her  still having bad anxiety Brandt \par S/P TIA  statin  nl ECG\par Abd bubble HH vs esopha  vs GB   NO CAD     GI appointment  October\par \par Neurology: Appointment cancelled  Jay[facility]  December 2 2022\par Saw him post hospitalization\par Taking ASA still\par \par

## 2023-07-19 ENCOUNTER — TRANSCRIPTION ENCOUNTER (OUTPATIENT)
Age: 48
End: 2023-07-19

## 2023-07-28 ENCOUNTER — TRANSCRIPTION ENCOUNTER (OUTPATIENT)
Age: 48
End: 2023-07-28

## 2023-08-01 ENCOUNTER — RX RENEWAL (OUTPATIENT)
Age: 48
End: 2023-08-01

## 2023-08-10 ENCOUNTER — RX RENEWAL (OUTPATIENT)
Age: 48
End: 2023-08-10

## 2023-08-10 RX ORDER — AMLODIPINE BESYLATE 5 MG/1
5 TABLET ORAL
Qty: 90 | Refills: 3 | Status: ACTIVE | COMMUNITY
Start: 2019-11-04 | End: 1900-01-01

## 2023-08-17 ENCOUNTER — NON-APPOINTMENT (OUTPATIENT)
Age: 48
End: 2023-08-17

## 2023-08-17 ENCOUNTER — TRANSCRIPTION ENCOUNTER (OUTPATIENT)
Age: 48
End: 2023-08-17

## 2023-08-28 ENCOUNTER — TRANSCRIPTION ENCOUNTER (OUTPATIENT)
Age: 48
End: 2023-08-28

## 2023-09-14 ENCOUNTER — APPOINTMENT (OUTPATIENT)
Dept: INTERNAL MEDICINE | Facility: CLINIC | Age: 48
End: 2023-09-14

## 2023-09-15 ENCOUNTER — TRANSCRIPTION ENCOUNTER (OUTPATIENT)
Age: 48
End: 2023-09-15

## 2023-09-17 ENCOUNTER — TRANSCRIPTION ENCOUNTER (OUTPATIENT)
Age: 48
End: 2023-09-17

## 2023-09-24 DIAGNOSIS — D72.829 ELEVATED WHITE BLOOD CELL COUNT, UNSPECIFIED: ICD-10-CM

## 2023-09-25 ENCOUNTER — OUTPATIENT (OUTPATIENT)
Dept: OUTPATIENT SERVICES | Facility: HOSPITAL | Age: 48
LOS: 1 days | Discharge: HOME | End: 2023-09-25

## 2023-09-25 ENCOUNTER — TRANSCRIPTION ENCOUNTER (OUTPATIENT)
Age: 48
End: 2023-09-25

## 2023-09-25 DIAGNOSIS — D47.2 MONOCLONAL GAMMOPATHY: ICD-10-CM

## 2023-09-27 ENCOUNTER — TRANSCRIPTION ENCOUNTER (OUTPATIENT)
Age: 48
End: 2023-09-27

## 2023-10-03 ENCOUNTER — RESULT REVIEW (OUTPATIENT)
Age: 48
End: 2023-10-03

## 2023-10-03 ENCOUNTER — OUTPATIENT (OUTPATIENT)
Dept: OUTPATIENT SERVICES | Facility: HOSPITAL | Age: 48
LOS: 1 days | End: 2023-10-03
Payer: COMMERCIAL

## 2023-10-03 ENCOUNTER — APPOINTMENT (OUTPATIENT)
Dept: HEMATOLOGY ONCOLOGY | Facility: CLINIC | Age: 48
End: 2023-10-03
Payer: COMMERCIAL

## 2023-10-03 VITALS
HEART RATE: 72 BPM | BODY MASS INDEX: 34.18 KG/M2 | SYSTOLIC BLOOD PRESSURE: 116 MMHG | WEIGHT: 192.88 LBS | RESPIRATION RATE: 16 BRPM | DIASTOLIC BLOOD PRESSURE: 79 MMHG | OXYGEN SATURATION: 99 % | HEIGHT: 63 IN | TEMPERATURE: 97.7 F

## 2023-10-03 DIAGNOSIS — D72.820 LYMPHOCYTOSIS (SYMPTOMATIC): ICD-10-CM

## 2023-10-03 DIAGNOSIS — D47.2 MONOCLONAL GAMMOPATHY: ICD-10-CM

## 2023-10-03 LAB
BASOPHILS # BLD AUTO: 0.01 K/UL — SIGNIFICANT CHANGE UP (ref 0–0.2)
BASOPHILS NFR BLD AUTO: 0.2 % — SIGNIFICANT CHANGE UP (ref 0–2)
EOSINOPHIL # BLD AUTO: 0.13 K/UL — SIGNIFICANT CHANGE UP (ref 0–0.5)
EOSINOPHIL NFR BLD AUTO: 2.9 % — SIGNIFICANT CHANGE UP (ref 0–6)
HCT VFR BLD CALC: 37 % — SIGNIFICANT CHANGE UP (ref 34.5–45)
HGB BLD-MCNC: 12.7 G/DL — SIGNIFICANT CHANGE UP (ref 11.5–15.5)
IMM GRANULOCYTES NFR BLD AUTO: 0 % — SIGNIFICANT CHANGE UP (ref 0–0.9)
LYMPHOCYTES # BLD AUTO: 2.33 K/UL — SIGNIFICANT CHANGE UP (ref 1–3.3)
LYMPHOCYTES # BLD AUTO: 51.4 % — HIGH (ref 13–44)
MCHC RBC-ENTMCNC: 29.5 PG — SIGNIFICANT CHANGE UP (ref 27–34)
MCHC RBC-ENTMCNC: 34.3 G/DL — SIGNIFICANT CHANGE UP (ref 32–36)
MCV RBC AUTO: 85.8 FL — SIGNIFICANT CHANGE UP (ref 80–100)
MONOCYTES # BLD AUTO: 0.29 K/UL — SIGNIFICANT CHANGE UP (ref 0–0.9)
MONOCYTES NFR BLD AUTO: 6.4 % — SIGNIFICANT CHANGE UP (ref 2–14)
NEUTROPHILS # BLD AUTO: 1.77 K/UL — LOW (ref 1.8–7.4)
NEUTROPHILS NFR BLD AUTO: 39.1 % — LOW (ref 43–77)
NRBC # BLD: 0 /100 WBCS — SIGNIFICANT CHANGE UP (ref 0–0)
PLATELET # BLD AUTO: 287 K/UL — SIGNIFICANT CHANGE UP (ref 150–400)
RBC # BLD: 4.31 M/UL — SIGNIFICANT CHANGE UP (ref 3.8–5.2)
RBC # FLD: 13.3 % — SIGNIFICANT CHANGE UP (ref 10.3–14.5)
WBC # BLD: 4.53 K/UL — SIGNIFICANT CHANGE UP (ref 3.8–10.5)
WBC # FLD AUTO: 4.53 K/UL — SIGNIFICANT CHANGE UP (ref 3.8–10.5)

## 2023-10-03 PROCEDURE — 86901 BLOOD TYPING SEROLOGIC RH(D): CPT

## 2023-10-03 PROCEDURE — 99215 OFFICE O/P EST HI 40 MIN: CPT

## 2023-10-03 PROCEDURE — 86900 BLOOD TYPING SEROLOGIC ABO: CPT

## 2023-10-03 PROCEDURE — 86850 RBC ANTIBODY SCREEN: CPT

## 2023-10-03 PROCEDURE — 86905 BLOOD TYPING RBC ANTIGENS: CPT

## 2023-10-03 RX ORDER — POTASSIUM CHLORIDE 1500 MG/1
20 TABLET, EXTENDED RELEASE ORAL
Qty: 180 | Refills: 3 | Status: DISCONTINUED | COMMUNITY
Start: 2022-11-30 | End: 2023-10-03

## 2023-10-03 RX ORDER — ESCITALOPRAM OXALATE 10 MG/1
10 TABLET ORAL
Qty: 30 | Refills: 4 | Status: DISCONTINUED | COMMUNITY
Start: 2023-07-19 | End: 2023-10-03

## 2023-10-03 RX ORDER — FAMOTIDINE 20 MG/1
20 TABLET, FILM COATED ORAL
Qty: 60 | Refills: 0 | Status: DISCONTINUED | COMMUNITY
Start: 2023-07-18 | End: 2023-10-03

## 2023-10-04 LAB
ALBUMIN MFR SERPL ELPH: 56.1 %
ALBUMIN SERPL ELPH-MCNC: 4.3 G/DL
ALBUMIN SERPL-MCNC: 4 G/DL
ALBUMIN/GLOB SERPL: 1.2 RATIO
ALP BLD-CCNC: 53 U/L
ALPHA1 GLOB MFR SERPL ELPH: 4.7 %
ALPHA1 GLOB SERPL ELPH-MCNC: 0.3 G/DL
ALPHA2 GLOB MFR SERPL ELPH: 8.5 %
ALPHA2 GLOB SERPL ELPH-MCNC: 0.6 G/DL
ALT SERPL-CCNC: 12 U/L
ANION GAP SERPL CALC-SCNC: 9 MMOL/L
AST SERPL-CCNC: 13 U/L
B-GLOBULIN MFR SERPL ELPH: 10.3 %
B-GLOBULIN SERPL ELPH-MCNC: 0.7 G/DL
BILIRUB SERPL-MCNC: 0.7 MG/DL
BUN SERPL-MCNC: 17 MG/DL
CALCIUM SERPL-MCNC: 9.6 MG/DL
CHLORIDE SERPL-SCNC: 101 MMOL/L
CO2 SERPL-SCNC: 27 MMOL/L
CREAT SERPL-MCNC: 0.89 MG/DL
DEPRECATED KAPPA LC FREE/LAMBDA SER: 4 RATIO
DEPRECATED KAPPA LC FREE/LAMBDA SER: 4.04 RATIO
EGFR: 80 ML/MIN/1.73M2
GAMMA GLOB FLD ELPH-MCNC: 1.5 G/DL
GAMMA GLOB MFR SERPL ELPH: 20.4 %
GLUCOSE SERPL-MCNC: 100 MG/DL
IGA SER QL IEP: 216 MG/DL
IGG SER QL IEP: 1693 MG/DL
IGM SER QL IEP: 75 MG/DL
INTERPRETATION SERPL IEP-IMP: NORMAL
KAPPA LC CSF-MCNC: 1.32 MG/DL
KAPPA LC CSF-MCNC: 1.36 MG/DL
KAPPA LC SERPL-MCNC: 5.33 MG/DL
KAPPA LC SERPL-MCNC: 5.44 MG/DL
LDH SERPL-CCNC: 200 U/L
M PROTEIN MFR SERPL ELPH: 5.4 %
MONOCLON BAND OBS SERPL: 0.4 G/DL
POTASSIUM SERPL-SCNC: 3.8 MMOL/L
PROT SERPL-MCNC: 7.2 G/DL
SODIUM SERPL-SCNC: 137 MMOL/L

## 2023-10-17 ENCOUNTER — TRANSCRIPTION ENCOUNTER (OUTPATIENT)
Age: 48
End: 2023-10-17

## 2023-10-22 ENCOUNTER — TRANSCRIPTION ENCOUNTER (OUTPATIENT)
Age: 48
End: 2023-10-22

## 2023-11-07 ENCOUNTER — TRANSCRIPTION ENCOUNTER (OUTPATIENT)
Age: 48
End: 2023-11-07

## 2023-11-14 ENCOUNTER — RX CHANGE (OUTPATIENT)
Age: 48
End: 2023-11-14

## 2023-11-14 ENCOUNTER — APPOINTMENT (OUTPATIENT)
Dept: GASTROENTEROLOGY | Facility: CLINIC | Age: 48
End: 2023-11-14
Payer: COMMERCIAL

## 2023-11-14 VITALS
DIASTOLIC BLOOD PRESSURE: 75 MMHG | SYSTOLIC BLOOD PRESSURE: 119 MMHG | OXYGEN SATURATION: 97 % | BODY MASS INDEX: 33.84 KG/M2 | HEIGHT: 63 IN | HEART RATE: 86 BPM | WEIGHT: 191 LBS

## 2023-11-14 DIAGNOSIS — R10.13 EPIGASTRIC PAIN: ICD-10-CM

## 2023-11-14 DIAGNOSIS — K82.4 CHOLESTEROLOSIS OF GALLBLADDER: ICD-10-CM

## 2023-11-14 PROCEDURE — 99204 OFFICE O/P NEW MOD 45 MIN: CPT

## 2023-11-14 RX ORDER — OMEPRAZOLE 40 MG/1
40 CAPSULE, DELAYED RELEASE ORAL
Qty: 30 | Refills: 3 | Status: ACTIVE | COMMUNITY
Start: 2023-11-14 | End: 1900-01-01

## 2023-11-16 ENCOUNTER — TRANSCRIPTION ENCOUNTER (OUTPATIENT)
Age: 48
End: 2023-11-16

## 2023-11-28 ENCOUNTER — TRANSCRIPTION ENCOUNTER (OUTPATIENT)
Age: 48
End: 2023-11-28

## 2023-12-18 ENCOUNTER — TRANSCRIPTION ENCOUNTER (OUTPATIENT)
Age: 48
End: 2023-12-18

## 2023-12-19 ENCOUNTER — RX RENEWAL (OUTPATIENT)
Age: 48
End: 2023-12-19

## 2023-12-19 RX ORDER — ATORVASTATIN CALCIUM 40 MG/1
40 TABLET, FILM COATED ORAL DAILY
Qty: 90 | Refills: 3 | Status: ACTIVE | COMMUNITY
Start: 2022-11-30 | End: 1900-01-01

## 2023-12-19 RX ORDER — CHLORTHALIDONE 25 MG/1
25 TABLET ORAL
Qty: 90 | Refills: 3 | Status: ACTIVE | COMMUNITY
Start: 2022-11-30 | End: 1900-01-01

## 2023-12-27 ENCOUNTER — OUTPATIENT (OUTPATIENT)
Dept: OUTPATIENT SERVICES | Facility: HOSPITAL | Age: 48
LOS: 1 days | End: 2023-12-27
Payer: MEDICARE

## 2023-12-27 ENCOUNTER — APPOINTMENT (OUTPATIENT)
Dept: INTERNAL MEDICINE | Facility: CLINIC | Age: 48
End: 2023-12-27
Payer: COMMERCIAL

## 2023-12-27 ENCOUNTER — NON-APPOINTMENT (OUTPATIENT)
Age: 48
End: 2023-12-27

## 2023-12-27 VITALS
HEART RATE: 75 BPM | WEIGHT: 195 LBS | HEIGHT: 63 IN | SYSTOLIC BLOOD PRESSURE: 120 MMHG | DIASTOLIC BLOOD PRESSURE: 70 MMHG | BODY MASS INDEX: 34.55 KG/M2 | OXYGEN SATURATION: 97 %

## 2023-12-27 VITALS — SYSTOLIC BLOOD PRESSURE: 112 MMHG | DIASTOLIC BLOOD PRESSURE: 72 MMHG

## 2023-12-27 DIAGNOSIS — Z12.39 ENCOUNTER FOR OTHER SCREENING FOR MALIGNANT NEOPLASM OF BREAST: ICD-10-CM

## 2023-12-27 DIAGNOSIS — Z23 ENCOUNTER FOR IMMUNIZATION: ICD-10-CM

## 2023-12-27 DIAGNOSIS — E55.9 VITAMIN D DEFICIENCY, UNSPECIFIED: ICD-10-CM

## 2023-12-27 DIAGNOSIS — R73.01 IMPAIRED FASTING GLUCOSE: ICD-10-CM

## 2023-12-27 DIAGNOSIS — Z12.11 ENCOUNTER FOR SCREENING FOR MALIGNANT NEOPLASM OF COLON: ICD-10-CM

## 2023-12-27 DIAGNOSIS — H44.119 PANUVEITIS, UNSPECIFIED EYE: ICD-10-CM

## 2023-12-27 DIAGNOSIS — G45.9 TRANSIENT CEREBRAL ISCHEMIC ATTACK, UNSPECIFIED: ICD-10-CM

## 2023-12-27 DIAGNOSIS — J45.909 UNSPECIFIED ASTHMA, UNCOMPLICATED: ICD-10-CM

## 2023-12-27 DIAGNOSIS — E66.9 OBESITY, UNSPECIFIED: ICD-10-CM

## 2023-12-27 DIAGNOSIS — I10 ESSENTIAL (PRIMARY) HYPERTENSION: ICD-10-CM

## 2023-12-27 PROCEDURE — 99396 PREV VISIT EST AGE 40-64: CPT

## 2023-12-27 PROCEDURE — G0463: CPT

## 2023-12-27 RX ORDER — TELMISARTAN 80 MG/1
80 TABLET ORAL
Qty: 90 | Refills: 3 | Status: ACTIVE | COMMUNITY
Start: 2019-11-04 | End: 1900-01-01

## 2023-12-27 RX ORDER — ESCITALOPRAM OXALATE 10 MG/1
10 TABLET ORAL
Qty: 90 | Refills: 3 | Status: ACTIVE | COMMUNITY
Start: 2023-10-03 | End: 1900-01-01

## 2023-12-27 RX ORDER — POTASSIUM CHLORIDE 1500 MG/1
20 TABLET, FILM COATED, EXTENDED RELEASE ORAL
Qty: 180 | Refills: 3 | Status: ACTIVE | COMMUNITY
Start: 2023-07-18 | End: 1900-01-01

## 2023-12-27 NOTE — HISTORY OF PRESENT ILLNESS
[FreeTextEntry1] : CPE epistaxis TIA without known risk JUDY + TIA rates LESS WITH MGUS? 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

## 2023-12-27 NOTE — HEALTH RISK ASSESSMENT
[No falls in past year] : Patient reported no falls in the past year [0] : 2) Feeling down, depressed, or hopeless: Not at all (0) [PHQ-2 Negative - No further assessment needed] : PHQ-2 Negative - No further assessment needed [Fully functional (bathing, dressing, toileting, transferring, walking, feeding)] : Fully functional (bathing, dressing, toileting, transferring, walking, feeding) [Fully functional (using the telephone, shopping, preparing meals, housekeeping, doing laundry, using] : Fully functional and needs no help or supervision to perform IADLs (using the telephone, shopping, preparing meals, housekeeping, doing laundry, using transportation, managing medications and managing finances) [Name: ___] : Health Care Proxy's Name: [unfilled]  [Relationship: ___] : Relationship: [unfilled] [[id number]] : 0 [Smoke Detector] : no smoke detector

## 2023-12-28 ENCOUNTER — RX CHANGE (OUTPATIENT)
Age: 48
End: 2023-12-28

## 2023-12-28 LAB
25(OH)D3 SERPL-MCNC: 49.3 NG/ML
ALBUMIN SERPL ELPH-MCNC: 4.6 G/DL
ALP BLD-CCNC: 59 U/L
ALT SERPL-CCNC: 16 U/L
ANION GAP SERPL CALC-SCNC: 13 MMOL/L
AST SERPL-CCNC: 11 U/L
BASOPHILS # BLD AUTO: 0.01 K/UL
BASOPHILS NFR BLD AUTO: 0.2 %
BILIRUB SERPL-MCNC: 0.8 MG/DL
BUN SERPL-MCNC: 14 MG/DL
CALCIUM SERPL-MCNC: 10.1 MG/DL
CHLORIDE SERPL-SCNC: 100 MMOL/L
CHOLEST SERPL-MCNC: 209 MG/DL
CO2 SERPL-SCNC: 24 MMOL/L
CREAT SERPL-MCNC: 0.93 MG/DL
CREAT SPEC-SCNC: 54 MG/DL
CREAT SPEC-SCNC: 54 MG/DL
CREAT/PROT UR: 0.1 RATIO
EGFR: 76 ML/MIN/1.73M2
EOSINOPHIL # BLD AUTO: 0.37 K/UL
EOSINOPHIL NFR BLD AUTO: 7 %
ESTIMATED AVERAGE GLUCOSE: 123 MG/DL
GLUCOSE SERPL-MCNC: 94 MG/DL
HBA1C MFR BLD HPLC: 5.9 %
HCT VFR BLD CALC: 41.2 %
HDLC SERPL-MCNC: 76 MG/DL
HGB BLD-MCNC: 13.4 G/DL
IMM GRANULOCYTES NFR BLD AUTO: 0.2 %
LDLC SERPL CALC-MCNC: 116 MG/DL
LYMPHOCYTES # BLD AUTO: 2.6 K/UL
LYMPHOCYTES NFR BLD AUTO: 49 %
MAN DIFF?: NORMAL
MCHC RBC-ENTMCNC: 27.9 PG
MCHC RBC-ENTMCNC: 32.5 GM/DL
MCV RBC AUTO: 85.8 FL
MICROALBUMIN 24H UR DL<=1MG/L-MCNC: <1.2 MG/DL
MICROALBUMIN/CREAT 24H UR-RTO: NORMAL MG/G
MONOCYTES # BLD AUTO: 0.38 K/UL
MONOCYTES NFR BLD AUTO: 7.2 %
NEUTROPHILS # BLD AUTO: 1.94 K/UL
NEUTROPHILS NFR BLD AUTO: 36.4 %
NONHDLC SERPL-MCNC: 133 MG/DL
PLATELET # BLD AUTO: 354 K/UL
POTASSIUM SERPL-SCNC: 3.7 MMOL/L
PROT SERPL-MCNC: 7.8 G/DL
PROT UR-MCNC: 4 MG/DL
RBC # BLD: 4.8 M/UL
RBC # FLD: 14.4 %
SODIUM SERPL-SCNC: 137 MMOL/L
TRIGL SERPL-MCNC: 98 MG/DL
VIT B12 SERPL-MCNC: 288 PG/ML
WBC # FLD AUTO: 5.31 K/UL

## 2023-12-30 LAB
ANA PAT FLD IF-IMP: NORMAL
ANACR T: ABNORMAL
DSDNA AB SER-ACNC: <12 IU/ML

## 2024-01-04 DIAGNOSIS — D70.8 OTHER NEUTROPENIA: ICD-10-CM

## 2024-01-04 DIAGNOSIS — R76.8 OTHER SPECIFIED ABNORMAL IMMUNOLOGICAL FINDINGS IN SERUM: ICD-10-CM

## 2024-01-04 DIAGNOSIS — R73.01 IMPAIRED FASTING GLUCOSE: ICD-10-CM

## 2024-01-04 DIAGNOSIS — J45.909 UNSPECIFIED ASTHMA, UNCOMPLICATED: ICD-10-CM

## 2024-01-04 DIAGNOSIS — G45.9 TRANSIENT CEREBRAL ISCHEMIC ATTACK, UNSPECIFIED: ICD-10-CM

## 2024-01-04 DIAGNOSIS — E66.9 OBESITY, UNSPECIFIED: ICD-10-CM

## 2024-01-04 DIAGNOSIS — H44.119 PANUVEITIS, UNSPECIFIED EYE: ICD-10-CM

## 2024-01-04 DIAGNOSIS — Z00.00 ENCOUNTER FOR GENERAL ADULT MEDICAL EXAMINATION WITHOUT ABNORMAL FINDINGS: ICD-10-CM

## 2024-01-04 DIAGNOSIS — D47.2 MONOCLONAL GAMMOPATHY: ICD-10-CM

## 2024-01-04 DIAGNOSIS — E55.9 VITAMIN D DEFICIENCY, UNSPECIFIED: ICD-10-CM

## 2024-01-16 ENCOUNTER — TRANSCRIPTION ENCOUNTER (OUTPATIENT)
Age: 49
End: 2024-01-16

## 2024-02-12 ENCOUNTER — NON-APPOINTMENT (OUTPATIENT)
Age: 49
End: 2024-02-12

## 2024-02-23 ENCOUNTER — RX CHANGE (OUTPATIENT)
Age: 49
End: 2024-02-23

## 2024-02-23 RX ORDER — FLUTICASONE PROPIONATE AND SALMETEROL 100; 50 UG/1; UG/1
100-50 POWDER RESPIRATORY (INHALATION)
Qty: 60 | Refills: 12 | Status: ACTIVE | COMMUNITY
Start: 2023-12-27 | End: 1900-01-01

## 2024-02-27 ENCOUNTER — APPOINTMENT (OUTPATIENT)
Dept: INTERNAL MEDICINE | Facility: CLINIC | Age: 49
End: 2024-02-27
Payer: COMMERCIAL

## 2024-02-27 ENCOUNTER — OUTPATIENT (OUTPATIENT)
Dept: OUTPATIENT SERVICES | Facility: HOSPITAL | Age: 49
LOS: 1 days | End: 2024-02-27
Payer: COMMERCIAL

## 2024-02-27 VITALS
WEIGHT: 190 LBS | DIASTOLIC BLOOD PRESSURE: 70 MMHG | HEART RATE: 82 BPM | BODY MASS INDEX: 33.66 KG/M2 | OXYGEN SATURATION: 98 % | SYSTOLIC BLOOD PRESSURE: 112 MMHG | HEIGHT: 63 IN

## 2024-02-27 DIAGNOSIS — R76.8 OTHER SPECIFIED ABNORMAL IMMUNOLOGICAL FINDINGS IN SERUM: ICD-10-CM

## 2024-02-27 DIAGNOSIS — I10 ESSENTIAL (PRIMARY) HYPERTENSION: ICD-10-CM

## 2024-02-27 DIAGNOSIS — J06.9 ACUTE UPPER RESPIRATORY INFECTION, UNSPECIFIED: ICD-10-CM

## 2024-02-27 DIAGNOSIS — D70.8 OTHER NEUTROPENIA: ICD-10-CM

## 2024-02-27 PROCEDURE — 99213 OFFICE O/P EST LOW 20 MIN: CPT

## 2024-02-27 PROCEDURE — G0463: CPT

## 2024-02-27 RX ORDER — AMOXICILLIN AND CLAVULANATE POTASSIUM 875; 125 MG/1; MG/1
875-125 TABLET, COATED ORAL
Qty: 14 | Refills: 0 | Status: ACTIVE | COMMUNITY
Start: 2024-02-27 | End: 1900-01-01

## 2024-02-27 RX ORDER — GUAIFENESIN 600 MG/1
600 TABLET, EXTENDED RELEASE ORAL TWICE DAILY
Qty: 8 | Refills: 0 | Status: COMPLETED | COMMUNITY
Start: 2024-02-27 | End: 2024-03-02

## 2024-02-27 NOTE — HISTORY OF PRESENT ILLNESS
[FreeTextEntry1] : Sore Throat Thick brown sputum. CT neck and chest negative Throat CX negative [de-identified] : 47 yo TIA unknown cause MGUS  Low potasium  MACARIO:L Asthma  colon given  mammogram   advair for asthma  After Super[employer]l  congestion, rhinorrhea x 2 days  went to work then sore throat  4 days later went to hosp CT throat and Chest CT  [facility] IVF    IV steroid  sent home potassium was high  given insulin to get potassium down 3.8  UTI  Macrobid x 7 days Now when cough

## 2024-02-27 NOTE — PHYSICAL EXAM
[No Respiratory Distress] : no respiratory distress  [No Accessory Muscle Use] : no accessory muscle use [Normal Percussion] : the chest was normal to percussion [Clear to Auscultation] : lungs were clear to auscultation bilaterally [Normal Rate] : normal rate  [Regular Rhythm] : with a regular rhythm [No Murmur] : no murmur heard [Normal S1, S2] : normal S1 and S2 [de-identified] : showed very thick viscous brown sputum

## 2024-02-28 LAB
RAPID RVP RESULT: NOT DETECTED
SARS-COV-2 RNA PNL RESP NAA+PROBE: NOT DETECTED

## 2024-02-29 RX ORDER — DOXYCYCLINE 100 MG/1
100 TABLET, FILM COATED ORAL
Qty: 14 | Refills: 0 | Status: COMPLETED | COMMUNITY
Start: 2024-02-27 | End: 2024-02-29

## 2024-03-04 DIAGNOSIS — R76.8 OTHER SPECIFIED ABNORMAL IMMUNOLOGICAL FINDINGS IN SERUM: ICD-10-CM

## 2024-03-04 DIAGNOSIS — D70.8 OTHER NEUTROPENIA: ICD-10-CM

## 2024-03-04 DIAGNOSIS — J06.9 ACUTE UPPER RESPIRATORY INFECTION, UNSPECIFIED: ICD-10-CM

## 2024-04-01 ENCOUNTER — OUTPATIENT (OUTPATIENT)
Dept: OUTPATIENT SERVICES | Facility: HOSPITAL | Age: 49
LOS: 1 days | Discharge: HOME | End: 2024-04-01

## 2024-04-01 DIAGNOSIS — D47.2 MONOCLONAL GAMMOPATHY: ICD-10-CM

## 2024-04-19 ENCOUNTER — RESULT REVIEW (OUTPATIENT)
Age: 49
End: 2024-04-19

## 2024-04-19 ENCOUNTER — LABORATORY RESULT (OUTPATIENT)
Age: 49
End: 2024-04-19

## 2024-04-19 ENCOUNTER — APPOINTMENT (OUTPATIENT)
Dept: HEMATOLOGY ONCOLOGY | Facility: CLINIC | Age: 49
End: 2024-04-19
Payer: COMMERCIAL

## 2024-04-19 ENCOUNTER — APPOINTMENT (OUTPATIENT)
Dept: HEMATOLOGY ONCOLOGY | Facility: CLINIC | Age: 49
End: 2024-04-19

## 2024-04-19 VITALS
HEART RATE: 66 BPM | DIASTOLIC BLOOD PRESSURE: 73 MMHG | OXYGEN SATURATION: 98 % | BODY MASS INDEX: 33.16 KG/M2 | SYSTOLIC BLOOD PRESSURE: 108 MMHG | TEMPERATURE: 97.2 F | RESPIRATION RATE: 16 BRPM | WEIGHT: 187.17 LBS

## 2024-04-19 DIAGNOSIS — D47.2 MONOCLONAL GAMMOPATHY: ICD-10-CM

## 2024-04-19 LAB
BASOPHILS # BLD AUTO: 0.02 K/UL — SIGNIFICANT CHANGE UP (ref 0–0.2)
BASOPHILS NFR BLD AUTO: 0.5 % — SIGNIFICANT CHANGE UP (ref 0–2)
EOSINOPHIL # BLD AUTO: 0.11 K/UL — SIGNIFICANT CHANGE UP (ref 0–0.5)
EOSINOPHIL NFR BLD AUTO: 2.5 % — SIGNIFICANT CHANGE UP (ref 0–6)
HCT VFR BLD CALC: 37.9 % — SIGNIFICANT CHANGE UP (ref 34.5–45)
HGB BLD-MCNC: 12.5 G/DL — SIGNIFICANT CHANGE UP (ref 11.5–15.5)
IMM GRANULOCYTES NFR BLD AUTO: 0.2 % — SIGNIFICANT CHANGE UP (ref 0–0.9)
LYMPHOCYTES # BLD AUTO: 2.71 K/UL — SIGNIFICANT CHANGE UP (ref 1–3.3)
LYMPHOCYTES # BLD AUTO: 61.2 % — HIGH (ref 13–44)
MCHC RBC-ENTMCNC: 28.9 PG — SIGNIFICANT CHANGE UP (ref 27–34)
MCHC RBC-ENTMCNC: 33 G/DL — SIGNIFICANT CHANGE UP (ref 32–36)
MCV RBC AUTO: 87.5 FL — SIGNIFICANT CHANGE UP (ref 80–100)
MONOCYTES # BLD AUTO: 0.28 K/UL — SIGNIFICANT CHANGE UP (ref 0–0.9)
MONOCYTES NFR BLD AUTO: 6.3 % — SIGNIFICANT CHANGE UP (ref 2–14)
NEUTROPHILS # BLD AUTO: 1.3 K/UL — LOW (ref 1.8–7.4)
NEUTROPHILS NFR BLD AUTO: 29.3 % — LOW (ref 43–77)
NRBC # BLD: 0 /100 WBCS — SIGNIFICANT CHANGE UP (ref 0–0)
PLATELET # BLD AUTO: 304 K/UL — SIGNIFICANT CHANGE UP (ref 150–400)
RBC # BLD: 4.33 M/UL — SIGNIFICANT CHANGE UP (ref 3.8–5.2)
RBC # FLD: 13.8 % — SIGNIFICANT CHANGE UP (ref 10.3–14.5)
WBC # BLD: 4.43 K/UL — SIGNIFICANT CHANGE UP (ref 3.8–10.5)
WBC # FLD AUTO: 4.43 K/UL — SIGNIFICANT CHANGE UP (ref 3.8–10.5)

## 2024-04-19 PROCEDURE — 99213 OFFICE O/P EST LOW 20 MIN: CPT

## 2024-04-22 LAB
ALBUMIN SERPL ELPH-MCNC: 4.3 G/DL
ALP BLD-CCNC: 53 U/L
ALT SERPL-CCNC: 14 U/L
ANION GAP SERPL CALC-SCNC: 13 MMOL/L
AST SERPL-CCNC: 16 U/L
BILIRUB SERPL-MCNC: 0.8 MG/DL
BUN SERPL-MCNC: 17 MG/DL
CALCIUM SERPL-MCNC: 10.1 MG/DL
CHLORIDE SERPL-SCNC: 101 MMOL/L
CO2 SERPL-SCNC: 26 MMOL/L
CREAT SERPL-MCNC: 0.97 MG/DL
DEPRECATED KAPPA LC FREE/LAMBDA SER: 3.82 RATIO
EGFR: 72 ML/MIN/1.73M2
GLUCOSE SERPL-MCNC: 104 MG/DL
IGA SER QL IEP: 218 MG/DL
IGG SER QL IEP: 1651 MG/DL
IGM SER QL IEP: 73 MG/DL
KAPPA LC CSF-MCNC: 1.52 MG/DL
KAPPA LC SERPL-MCNC: 5.8 MG/DL
LDH SERPL-CCNC: 212 U/L
POTASSIUM SERPL-SCNC: 4.2 MMOL/L
PROT SERPL-MCNC: 7.5 G/DL
SODIUM SERPL-SCNC: 140 MMOL/L

## 2024-04-23 LAB
ALBUMIN MFR SERPL ELPH: 56.9 %
ALBUMIN SERPL-MCNC: 4.3 G/DL
ALBUMIN/GLOB SERPL: 1.3 RATIO
ALPHA1 GLOB MFR SERPL ELPH: 3.7 %
ALPHA1 GLOB SERPL ELPH-MCNC: 0.3 G/DL
ALPHA2 GLOB MFR SERPL ELPH: 8 %
ALPHA2 GLOB SERPL ELPH-MCNC: 0.6 G/DL
B-GLOBULIN MFR SERPL ELPH: 10.4 %
B-GLOBULIN SERPL ELPH-MCNC: 0.8 G/DL
GAMMA GLOB FLD ELPH-MCNC: 1.6 G/DL
GAMMA GLOB MFR SERPL ELPH: 21 %
INTERPRETATION SERPL IEP-IMP: NORMAL
M PROTEIN MFR SERPL ELPH: 5.1 %
MONOCLON BAND OBS SERPL: 0.4 G/DL
PROT SERPL-MCNC: 7.5 G/DL
PROT SERPL-MCNC: 7.5 G/DL

## 2024-04-26 PROBLEM — D47.2 MONOCLONAL GAMMOPATHY OF UNDETERMINED SIGNIFICANCE: Status: ACTIVE | Noted: 2023-01-05

## 2024-04-26 NOTE — PHYSICAL EXAM
[de-identified] : Right ear canal/TM benign [de-identified] : BS normal. Soft. Tender to palpation in midepigastrium without guarding, rebound. No H-S megaly, masses. [de-identified] : Octavioos

## 2024-04-26 NOTE — HISTORY OF PRESENT ILLNESS
[de-identified] : IgGk Monoclonal gammopathy of uncertain significance [de-identified] : She feels well.  Intermittent tingling in hands and feet persists, but is stable. She still has blurry eyesight which is chronic, is being followed by her Ophthalmologist for this. No further epigastric pain.  Will have colonsocopy soon.  She reports easy bruising.  Also notes chronic night sweats with drenching of neck region.  She notes no headaches, fever, swollen glands, sore throat, infections, SOB, vomiting, melena, BRBPR, skeletal pain, bleeding, weight loss.

## 2024-04-26 NOTE — ASSESSMENT
[FreeTextEntry1] : 46 YO F found to have IgG kappa monoclonal gammopathy in the course of evaluation for a positive JUDY. 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 Flow was non diagnostic. It is of note that her mother also has IgG kappa MGUS. This condition is not hereditary, but first degree relatives are at increased risk of developing it. Rather than lymphocytosis, she may have benign neutropenia, which is common in black Americans. These individuals are not at increased risk for infection. Her ANC is adequate.  Plan: Observe CMP, Quant Ig, SPEP, LDH, SFLC Flu, COVID vaccines as recommended. Avoid IV CT contrast due to MGUS GI consult-has colonoscopy scheduled RTC 6 months

## 2024-04-26 NOTE — REVIEW OF SYSTEMS
[Abdominal Pain] : no abdominal pain [Negative] : Gastrointestinal [de-identified] : Toes and fingers tingle

## 2024-04-26 NOTE — RESULTS/DATA
[FreeTextEntry1] : Flow Cytometry diagnosis: Peripheral blood:      - The lymphocyte immunophenotypic findings show no diagnostic abnormalities with multiple subsets of CD57+ natural killer-like T-cells (CD8+, CD4+, CD4+CD8+) with  no significant antigen aberrancy.  Multiple subsets suggest reactive process, and additional  TCRB1 shows polytypic pattern.      - The myeloid immunophenotypic findings show normal myeloid granularity, no increase in myeloid immaturity, and normal myeloid antigen maturation pattern.

## 2024-06-13 ENCOUNTER — EMERGENCY (EMERGENCY)
Facility: HOSPITAL | Age: 49
LOS: 1 days | Discharge: HOME | End: 2024-06-13
Attending: EMERGENCY MEDICINE
Payer: COMMERCIAL

## 2024-06-13 VITALS
DIASTOLIC BLOOD PRESSURE: 91 MMHG | SYSTOLIC BLOOD PRESSURE: 150 MMHG | TEMPERATURE: 98 F | RESPIRATION RATE: 16 BRPM | WEIGHT: 184.97 LBS | HEART RATE: 92 BPM | HEIGHT: 63 IN | OXYGEN SATURATION: 100 %

## 2024-06-13 VITALS
DIASTOLIC BLOOD PRESSURE: 85 MMHG | SYSTOLIC BLOOD PRESSURE: 138 MMHG | OXYGEN SATURATION: 100 % | RESPIRATION RATE: 16 BRPM | HEART RATE: 75 BPM | TEMPERATURE: 98 F

## 2024-06-13 LAB
ALBUMIN SERPL ELPH-MCNC: 4.4 G/DL — SIGNIFICANT CHANGE UP (ref 3.3–5)
ALP SERPL-CCNC: 54 U/L — SIGNIFICANT CHANGE UP (ref 40–120)
ALT FLD-CCNC: 20 U/L — SIGNIFICANT CHANGE UP (ref 10–45)
ANION GAP SERPL CALC-SCNC: 13 MMOL/L — SIGNIFICANT CHANGE UP (ref 5–17)
AST SERPL-CCNC: 17 U/L — SIGNIFICANT CHANGE UP (ref 10–40)
BASOPHILS # BLD AUTO: 0.02 K/UL — SIGNIFICANT CHANGE UP (ref 0–0.2)
BASOPHILS NFR BLD AUTO: 0.3 % — SIGNIFICANT CHANGE UP (ref 0–2)
BILIRUB SERPL-MCNC: 0.5 MG/DL — SIGNIFICANT CHANGE UP (ref 0.2–1.2)
BUN SERPL-MCNC: 19 MG/DL — SIGNIFICANT CHANGE UP (ref 7–23)
CALCIUM SERPL-MCNC: 9.9 MG/DL — SIGNIFICANT CHANGE UP (ref 8.4–10.5)
CHLORIDE SERPL-SCNC: 102 MMOL/L — SIGNIFICANT CHANGE UP (ref 96–108)
CO2 SERPL-SCNC: 25 MMOL/L — SIGNIFICANT CHANGE UP (ref 22–31)
CREAT SERPL-MCNC: 1.07 MG/DL — SIGNIFICANT CHANGE UP (ref 0.5–1.3)
EGFR: 64 ML/MIN/1.73M2 — SIGNIFICANT CHANGE UP
EOSINOPHIL # BLD AUTO: 0.11 K/UL — SIGNIFICANT CHANGE UP (ref 0–0.5)
EOSINOPHIL NFR BLD AUTO: 1.8 % — SIGNIFICANT CHANGE UP (ref 0–6)
GLUCOSE SERPL-MCNC: 117 MG/DL — HIGH (ref 70–99)
HCT VFR BLD CALC: 37 % — SIGNIFICANT CHANGE UP (ref 34.5–45)
HGB BLD-MCNC: 12.7 G/DL — SIGNIFICANT CHANGE UP (ref 11.5–15.5)
IMM GRANULOCYTES NFR BLD AUTO: 0.2 % — SIGNIFICANT CHANGE UP (ref 0–0.9)
LYMPHOCYTES # BLD AUTO: 3.21 K/UL — SIGNIFICANT CHANGE UP (ref 1–3.3)
LYMPHOCYTES # BLD AUTO: 53.1 % — HIGH (ref 13–44)
MCHC RBC-ENTMCNC: 28.9 PG — SIGNIFICANT CHANGE UP (ref 27–34)
MCHC RBC-ENTMCNC: 34.3 GM/DL — SIGNIFICANT CHANGE UP (ref 32–36)
MCV RBC AUTO: 84.3 FL — SIGNIFICANT CHANGE UP (ref 80–100)
MONOCYTES # BLD AUTO: 0.38 K/UL — SIGNIFICANT CHANGE UP (ref 0–0.9)
MONOCYTES NFR BLD AUTO: 6.3 % — SIGNIFICANT CHANGE UP (ref 2–14)
NEUTROPHILS # BLD AUTO: 2.31 K/UL — SIGNIFICANT CHANGE UP (ref 1.8–7.4)
NEUTROPHILS NFR BLD AUTO: 38.3 % — LOW (ref 43–77)
NRBC # BLD: 0 /100 WBCS — SIGNIFICANT CHANGE UP (ref 0–0)
PLATELET # BLD AUTO: 328 K/UL — SIGNIFICANT CHANGE UP (ref 150–400)
POTASSIUM SERPL-MCNC: 3.1 MMOL/L — LOW (ref 3.5–5.3)
POTASSIUM SERPL-SCNC: 3.1 MMOL/L — LOW (ref 3.5–5.3)
PROT SERPL-MCNC: 7.8 G/DL — SIGNIFICANT CHANGE UP (ref 6–8.3)
RBC # BLD: 4.39 M/UL — SIGNIFICANT CHANGE UP (ref 3.8–5.2)
RBC # FLD: 13.8 % — SIGNIFICANT CHANGE UP (ref 10.3–14.5)
SODIUM SERPL-SCNC: 140 MMOL/L — SIGNIFICANT CHANGE UP (ref 135–145)
WBC # BLD: 6.04 K/UL — SIGNIFICANT CHANGE UP (ref 3.8–10.5)
WBC # FLD AUTO: 6.04 K/UL — SIGNIFICANT CHANGE UP (ref 3.8–10.5)

## 2024-06-13 PROCEDURE — 85025 COMPLETE CBC W/AUTO DIFF WBC: CPT

## 2024-06-13 PROCEDURE — 99284 EMERGENCY DEPT VISIT MOD MDM: CPT | Mod: 25

## 2024-06-13 PROCEDURE — 36000 PLACE NEEDLE IN VEIN: CPT

## 2024-06-13 PROCEDURE — 80053 COMPREHEN METABOLIC PANEL: CPT

## 2024-06-13 PROCEDURE — 93005 ELECTROCARDIOGRAM TRACING: CPT

## 2024-06-13 PROCEDURE — 99284 EMERGENCY DEPT VISIT MOD MDM: CPT

## 2024-06-13 RX ORDER — SODIUM CHLORIDE 9 MG/ML
500 INJECTION INTRAMUSCULAR; INTRAVENOUS; SUBCUTANEOUS ONCE
Refills: 0 | Status: COMPLETED | OUTPATIENT
Start: 2024-06-13 | End: 2024-06-13

## 2024-06-13 RX ORDER — POTASSIUM CHLORIDE 20 MEQ
40 PACKET (EA) ORAL ONCE
Refills: 0 | Status: COMPLETED | OUTPATIENT
Start: 2024-06-13 | End: 2024-06-13

## 2024-06-13 RX ORDER — MECLIZINE HCL 12.5 MG
25 TABLET ORAL ONCE
Refills: 0 | Status: COMPLETED | OUTPATIENT
Start: 2024-06-13 | End: 2024-06-13

## 2024-06-13 RX ADMIN — Medication 40 MILLIEQUIVALENT(S): at 19:20

## 2024-06-13 RX ADMIN — SODIUM CHLORIDE 500 MILLILITER(S): 9 INJECTION INTRAMUSCULAR; INTRAVENOUS; SUBCUTANEOUS at 18:05

## 2024-06-13 RX ADMIN — Medication 25 MILLIGRAM(S): at 18:04

## 2024-06-13 NOTE — ED PROVIDER NOTE - OBJECTIVE STATEMENT
Patient is a 49-year-old female past medical history hypertension, MGUS, TIA, presenting for hypertension and epistaxis.  Patient states at this morning while at work at [employer], she began to have nosebleed which she realized that blood was dripping onto her shirt.  Additionally began to have room spinning dizziness worse while standing, improved with sitting down/lying down.  Has had similar symptoms with elevated blood pressure in the past, measured it to be 176/100 whereas typically she is in 140s systolic.  Took all of her blood pressure medications today including telmisartan, amlodipine, hydrochlorothiazide.  Did not take any medications following onset of symptoms.  Occurs in the setting of 1 week runny nose for which she has been taking Zyrtec without the decongestant.  No chest pain, shortness of breath, weakness, numbness.  No other medication use.

## 2024-06-13 NOTE — ED PROVIDER NOTE - NSFOLLOWUPINSTRUCTIONS_ED_ALL_ED_FT
You were seen in the ED for high blood pressure, dizziness    Your evaluation including blood tests and EKG showed no findings requiring further evaluation or treatment in the hospital at this time.    Your potassium was found to be low, you were given potassium here.    Please follow up with your PCP as discussed within 1 week.  Discuss your symptoms, medications, and results in this packet.    Seek immediate medical attention if you experience new or worsening symptoms.

## 2024-06-13 NOTE — ED PROVIDER NOTE - PATIENT PORTAL LINK FT
You can access the FollowMyHealth Patient Portal offered by [facility] by registering at the following website: [email or web address]. By joining [facility]’s FollowMyHealth portal, you will also be able to view your health information using other applications (apps) compatible with our system.

## 2024-06-13 NOTE — ED ADULT NURSE NOTE - OBJECTIVE STATEMENT
49yF, PMH HTN, TIA in the past with no residual defecits, presents to the ED with elevated BP. Pt states she was at work when she spontaneously got a nose bleed that lasted approx 5 minutes. Pt states that she does not typically get nosebleeds but that it happens when her pressure is elevated. Pt became dizzy and experienced palpitations as well. Pt had BP taken and noted 170's/100's at that time. Pt came to [facility] ED for further eval. Gross neuro intact, no difficulty speaking in complete sentences, pulses x 4, moving all extremities, abdomen soft nontender nondistended, skin intact. Pt denies fevers/chills, numbness/tingling, weakness, headache, vision changes, cp, sob, cough, abd pain, n/v/d, dysuria, hematuria, bloody stools.

## 2024-06-13 NOTE — ED PROVIDER NOTE - CARE PLAN
1 Principal Discharge DX:	Hypertension   Principal Discharge DX:	Hypertension  Secondary Diagnosis:	Hypokalemia  Secondary Diagnosis:	Dizziness

## 2024-06-13 NOTE — ED PROVIDER NOTE - CLINICAL SUMMARY MEDICAL DECISION MAKING FREE TEXT BOX
Patient is a 49-year-old female past medical history hypertension, MGUS, TIA, presenting for hypertension and epistaxis. With vital signs currently /90, otherwise within normal limits and stable.  With room spinning dizziness and now resolved epistaxis with high blood pressure reading while at work, with stressors at work.  No other complaints consistent with endorgan ischemia.  Nonfocal neurologic exam.  Low likelihood of endorgan ischemia, to screen labs.  Dizziness likely peripheral in etiology given description versus dehydration given diuresis use, to give fluids and meclizine, reassess.  No findings suggestive of central etiology. Patient is a 49-year-old female past medical history hypertension, MGUS, TIA on ASA, presenting for hypertension and epistaxis. With vital signs currently /90, otherwise within normal limits and stable.  With room spinning dizziness, worse w standing and now resolved epistaxis with high blood pressure reading while at work, with stressors at work (works at [employer]).  No other complaints consistent with end-organ ischemia- no cp, sob, ha, vision changes, weakness, numbness, tingling, slurred speech.  Nonfocal neurologic exam.  Dizziness likely peripheral in etiology given description versus dehydration given diuresis use, to give fluids and meclizine, reassess.  No findings suggestive of central etiology/CVA. EKG non actionable. BPs within acceptable limits w/o PE findings of end organ injury. [facility]ELI[facility].

## 2024-06-13 NOTE — ED PROVIDER NOTE - PROGRESS NOTE DETAILS
Marcus Urias MD PGY2: Patient feels improved after medications.  Labs with hypokalemia, to treat.  Otherwise nonactionable. Discussed with patient all results including any incidentals. Discussed discharge, follow up, return precautions, medications. Patient verbalizes understanding and is amenable at this time. Answered patient questions.

## 2024-06-13 NOTE — ED PROVIDER NOTE - PHYSICAL EXAMINATION
CONSTITUTIONAL: awake; in no acute distress.   SKIN: warm, dry  HEAD: Normocephalic; atraumatic.  EYES: no conjunctival injection. PERRL. No scleral icterus  ENT: No nasal discharge; airway clear.  CARD: S1, S2 normal; no murmurs, gallops, or rubs. Regular rate and rhythm.   RESP: No wheezes, rales or rhonchi. Good air movement bilaterally.   ABD: soft ntnd, no guarding, no distention, no rigidity.   EXT:  No cyanosis or edema.   NEURO: alert, oriented to ppte, cn 2-12 intact, motor strength 5/5 throughout, sensation intact throughout, coordination intact  PSYCH: Cooperative, appropriate.

## 2024-06-14 ENCOUNTER — NON-APPOINTMENT (OUTPATIENT)
Age: 49
End: 2024-06-14

## 2024-08-05 ENCOUNTER — RX RENEWAL (OUTPATIENT)
Age: 49
End: 2024-08-05

## 2024-10-10 DIAGNOSIS — D47.2 MONOCLONAL GAMMOPATHY: ICD-10-CM

## 2024-10-15 ENCOUNTER — LABORATORY RESULT (OUTPATIENT)
Age: 49
End: 2024-10-15

## 2024-10-15 ENCOUNTER — RESULT REVIEW (OUTPATIENT)
Age: 49
End: 2024-10-15

## 2024-10-15 ENCOUNTER — APPOINTMENT (OUTPATIENT)
Dept: HEMATOLOGY ONCOLOGY | Facility: CLINIC | Age: 49
End: 2024-10-15
Payer: COMMERCIAL

## 2024-10-15 ENCOUNTER — APPOINTMENT (OUTPATIENT)
Dept: HEMATOLOGY ONCOLOGY | Facility: CLINIC | Age: 49
End: 2024-10-15

## 2024-10-15 ENCOUNTER — NON-APPOINTMENT (OUTPATIENT)
Age: 49
End: 2024-10-15

## 2024-10-15 VITALS
RESPIRATION RATE: 16 BRPM | TEMPERATURE: 97.3 F | OXYGEN SATURATION: 99 % | HEART RATE: 85 BPM | HEIGHT: 63 IN | SYSTOLIC BLOOD PRESSURE: 131 MMHG | DIASTOLIC BLOOD PRESSURE: 89 MMHG | WEIGHT: 193.98 LBS | BODY MASS INDEX: 34.37 KG/M2

## 2024-10-15 DIAGNOSIS — D47.2 MONOCLONAL GAMMOPATHY: ICD-10-CM

## 2024-10-15 PROCEDURE — 99213 OFFICE O/P EST LOW 20 MIN: CPT

## 2024-10-17 LAB
ALBUMIN SERPL ELPH-MCNC: 4.1 G/DL
ALP BLD-CCNC: 48 U/L
ALT SERPL-CCNC: 14 U/L
ANION GAP SERPL CALC-SCNC: 12 MMOL/L
AST SERPL-CCNC: 17 U/L
BILIRUB SERPL-MCNC: 0.5 MG/DL
BUN SERPL-MCNC: 19 MG/DL
CALCIUM SERPL-MCNC: 10.1 MG/DL
CHLORIDE SERPL-SCNC: 101 MMOL/L
CO2 SERPL-SCNC: 25 MMOL/L
CREAT SERPL-MCNC: 0.9 MG/DL
DEPRECATED KAPPA LC FREE/LAMBDA SER: 3.68 RATIO
EGFR: 78 ML/MIN/1.73M2
GLUCOSE SERPL-MCNC: 96 MG/DL
IGA SER QL IEP: 200 MG/DL
IGG SER QL IEP: 1621 MG/DL
IGM SER QL IEP: 67 MG/DL
KAPPA LC CSF-MCNC: 1.29 MG/DL
KAPPA LC SERPL-MCNC: 4.75 MG/DL
LDH SERPL-CCNC: 199 U/L
POTASSIUM SERPL-SCNC: 4.1 MMOL/L
PROT SERPL-MCNC: 7.3 G/DL
SODIUM SERPL-SCNC: 139 MMOL/L

## 2024-10-18 LAB
ALBUMIN MFR SERPL ELPH: 58.2 %
ALBUMIN SERPL-MCNC: 4.3 G/DL
ALBUMIN/GLOB SERPL: 1.4 RATIO
ALPHA1 GLOB MFR SERPL ELPH: 3.5 %
ALPHA1 GLOB SERPL ELPH-MCNC: 0.3 G/DL
ALPHA2 GLOB MFR SERPL ELPH: 7.4 %
ALPHA2 GLOB SERPL ELPH-MCNC: 0.5 G/DL
B-GLOBULIN MFR SERPL ELPH: 10.6 %
B-GLOBULIN SERPL ELPH-MCNC: 0.8 G/DL
GAMMA GLOB FLD ELPH-MCNC: 1.5 G/DL
GAMMA GLOB MFR SERPL ELPH: 20.3 %
INTERPRETATION SERPL IEP-IMP: NORMAL
M PROTEIN MFR SERPL ELPH: 5.9 %
MONOCLON BAND OBS SERPL: 0.4 G/DL
PROT SERPL-MCNC: 7.4 G/DL
PROT SERPL-MCNC: 7.4 G/DL
